# Patient Record
Sex: FEMALE | Race: WHITE | NOT HISPANIC OR LATINO
[De-identification: names, ages, dates, MRNs, and addresses within clinical notes are randomized per-mention and may not be internally consistent; named-entity substitution may affect disease eponyms.]

---

## 2017-06-01 ENCOUNTER — RX ONLY (OUTPATIENT)
Age: 77
Setting detail: RX ONLY
End: 2017-06-01

## 2017-06-01 PROBLEM — D23.39 OTHER BENIGN NEOPLASM OF SKIN OF OTHER PARTS OF FACE: Status: ACTIVE | Noted: 2017-06-01

## 2018-02-20 ENCOUNTER — APPOINTMENT (OUTPATIENT)
Dept: RADIOLOGY | Facility: MEDICAL CENTER | Age: 78
DRG: 481 | End: 2018-02-20
Attending: EMERGENCY MEDICINE
Payer: MEDICARE

## 2018-02-20 ENCOUNTER — RESOLUTE PROFESSIONAL BILLING HOSPITAL PROF FEE (OUTPATIENT)
Dept: HOSPITALIST | Facility: MEDICAL CENTER | Age: 78
End: 2018-02-20
Payer: MEDICARE

## 2018-02-20 ENCOUNTER — HOSPITAL ENCOUNTER (OUTPATIENT)
Dept: RADIOLOGY | Facility: MEDICAL CENTER | Age: 78
End: 2018-02-20

## 2018-02-20 ENCOUNTER — HOSPITAL ENCOUNTER (INPATIENT)
Facility: MEDICAL CENTER | Age: 78
LOS: 3 days | DRG: 481 | End: 2018-02-23
Attending: EMERGENCY MEDICINE | Admitting: FAMILY MEDICINE
Payer: MEDICARE
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
## 2018-02-20 DIAGNOSIS — S72.001A CLOSED FRACTURE OF RIGHT HIP, INITIAL ENCOUNTER (HCC): ICD-10-CM

## 2018-02-20 DIAGNOSIS — N39.0 ACUTE UTI: ICD-10-CM

## 2018-02-20 PROBLEM — S72.009A HIP FRACTURE (HCC): Status: ACTIVE | Noted: 2018-02-20

## 2018-02-20 LAB
ALBUMIN SERPL BCP-MCNC: 4.1 G/DL (ref 3.2–4.9)
ALBUMIN/GLOB SERPL: 1.3 G/DL
ALP SERPL-CCNC: 44 U/L (ref 30–99)
ALT SERPL-CCNC: 12 U/L (ref 2–50)
ANION GAP SERPL CALC-SCNC: 10 MMOL/L (ref 0–11.9)
APPEARANCE UR: CLEAR
APTT PPP: 26.5 SEC (ref 24.7–36)
AST SERPL-CCNC: 17 U/L (ref 12–45)
BACTERIA #/AREA URNS HPF: NEGATIVE /HPF
BASOPHILS # BLD AUTO: 0.6 % (ref 0–1.8)
BASOPHILS # BLD: 0.1 K/UL (ref 0–0.12)
BILIRUB SERPL-MCNC: 0.5 MG/DL (ref 0.1–1.5)
BILIRUB UR QL STRIP.AUTO: NEGATIVE
BNP SERPL-MCNC: 44 PG/ML (ref 0–100)
BUN SERPL-MCNC: 11 MG/DL (ref 8–22)
CALCIUM SERPL-MCNC: 9.9 MG/DL (ref 8.5–10.5)
CHLORIDE SERPL-SCNC: 106 MMOL/L (ref 96–112)
CO2 SERPL-SCNC: 23 MMOL/L (ref 20–33)
COLOR UR: ABNORMAL
CREAT SERPL-MCNC: 0.87 MG/DL (ref 0.5–1.4)
EOSINOPHIL # BLD AUTO: 0.02 K/UL (ref 0–0.51)
EOSINOPHIL NFR BLD: 0.1 % (ref 0–6.9)
EPI CELLS #/AREA URNS HPF: NEGATIVE /HPF
ERYTHROCYTE [DISTWIDTH] IN BLOOD BY AUTOMATED COUNT: 40.9 FL (ref 35.9–50)
GLOBULIN SER CALC-MCNC: 3.2 G/DL (ref 1.9–3.5)
GLUCOSE SERPL-MCNC: 124 MG/DL (ref 65–99)
GLUCOSE UR STRIP.AUTO-MCNC: NEGATIVE MG/DL
HCT VFR BLD AUTO: 35.3 % (ref 37–47)
HGB BLD-MCNC: 11.8 G/DL (ref 12–16)
HYALINE CASTS #/AREA URNS LPF: NORMAL /LPF
IMM GRANULOCYTES # BLD AUTO: 0.07 K/UL (ref 0–0.11)
IMM GRANULOCYTES NFR BLD AUTO: 0.4 % (ref 0–0.9)
INR PPP: 1.08 (ref 0.87–1.13)
KETONES UR STRIP.AUTO-MCNC: ABNORMAL MG/DL
LEUKOCYTE ESTERASE UR QL STRIP.AUTO: NEGATIVE
LYMPHOCYTES # BLD AUTO: 3.2 K/UL (ref 1–4.8)
LYMPHOCYTES NFR BLD: 19.4 % (ref 22–41)
MCH RBC QN AUTO: 29.5 PG (ref 27–33)
MCHC RBC AUTO-ENTMCNC: 33.4 G/DL (ref 33.6–35)
MCV RBC AUTO: 88.3 FL (ref 81.4–97.8)
MICRO URNS: ABNORMAL
MONOCYTES # BLD AUTO: 1.25 K/UL (ref 0–0.85)
MONOCYTES NFR BLD AUTO: 7.6 % (ref 0–13.4)
NEUTROPHILS # BLD AUTO: 11.88 K/UL (ref 2–7.15)
NEUTROPHILS NFR BLD: 71.9 % (ref 44–72)
NITRITE UR QL STRIP.AUTO: POSITIVE
NRBC # BLD AUTO: 0 K/UL
NRBC BLD-RTO: 0 /100 WBC
PH UR STRIP.AUTO: 6.5 [PH]
PLATELET # BLD AUTO: 338 K/UL (ref 164–446)
PMV BLD AUTO: 10.4 FL (ref 9–12.9)
POTASSIUM SERPL-SCNC: 3.5 MMOL/L (ref 3.6–5.5)
PROT SERPL-MCNC: 7.3 G/DL (ref 6–8.2)
PROT UR QL STRIP: NEGATIVE MG/DL
PROTHROMBIN TIME: 13.7 SEC (ref 12–14.6)
RBC # BLD AUTO: 4 M/UL (ref 4.2–5.4)
RBC # URNS HPF: NORMAL /HPF
RBC UR QL AUTO: NEGATIVE
SODIUM SERPL-SCNC: 139 MMOL/L (ref 135–145)
SP GR UR STRIP.AUTO: 1.01
TROPONIN I SERPL-MCNC: <0.01 NG/ML (ref 0–0.04)
UROBILINOGEN UR STRIP.AUTO-MCNC: 0.2 MG/DL
WBC # BLD AUTO: 16.5 K/UL (ref 4.8–10.8)
WBC #/AREA URNS HPF: NORMAL /HPF

## 2018-02-20 PROCEDURE — 81001 URINALYSIS AUTO W/SCOPE: CPT

## 2018-02-20 PROCEDURE — 36415 COLL VENOUS BLD VENIPUNCTURE: CPT

## 2018-02-20 PROCEDURE — 85610 PROTHROMBIN TIME: CPT

## 2018-02-20 PROCEDURE — 99223 1ST HOSP IP/OBS HIGH 75: CPT | Mod: AI | Performed by: FAMILY MEDICINE

## 2018-02-20 PROCEDURE — 72170 X-RAY EXAM OF PELVIS: CPT

## 2018-02-20 PROCEDURE — 700111 HCHG RX REV CODE 636 W/ 250 OVERRIDE (IP): Performed by: EMERGENCY MEDICINE

## 2018-02-20 PROCEDURE — 96374 THER/PROPH/DIAG INJ IV PUSH: CPT

## 2018-02-20 PROCEDURE — 85730 THROMBOPLASTIN TIME PARTIAL: CPT

## 2018-02-20 PROCEDURE — 85025 COMPLETE CBC W/AUTO DIFF WBC: CPT

## 2018-02-20 PROCEDURE — 770006 HCHG ROOM/CARE - MED/SURG/GYN SEMI*

## 2018-02-20 PROCEDURE — 83880 ASSAY OF NATRIURETIC PEPTIDE: CPT

## 2018-02-20 PROCEDURE — 84484 ASSAY OF TROPONIN QUANT: CPT

## 2018-02-20 PROCEDURE — 84443 ASSAY THYROID STIM HORMONE: CPT

## 2018-02-20 PROCEDURE — 71045 X-RAY EXAM CHEST 1 VIEW: CPT

## 2018-02-20 PROCEDURE — 80053 COMPREHEN METABOLIC PANEL: CPT

## 2018-02-20 PROCEDURE — 93005 ELECTROCARDIOGRAM TRACING: CPT | Performed by: EMERGENCY MEDICINE

## 2018-02-20 PROCEDURE — 73552 X-RAY EXAM OF FEMUR 2/>: CPT | Mod: RT

## 2018-02-20 PROCEDURE — 99285 EMERGENCY DEPT VISIT HI MDM: CPT

## 2018-02-20 RX ORDER — ONDANSETRON 2 MG/ML
4 INJECTION INTRAMUSCULAR; INTRAVENOUS ONCE
Status: ACTIVE | OUTPATIENT
Start: 2018-02-20 | End: 2018-02-21

## 2018-02-20 RX ORDER — LISINOPRIL 5 MG/1
5 TABLET ORAL DAILY
COMMUNITY

## 2018-02-20 RX ORDER — CEFTRIAXONE 1 G/1
1 INJECTION, POWDER, FOR SOLUTION INTRAMUSCULAR; INTRAVENOUS ONCE
Status: COMPLETED | OUTPATIENT
Start: 2018-02-20 | End: 2018-02-20

## 2018-02-20 RX ORDER — AMLODIPINE BESYLATE 5 MG/1
5 TABLET ORAL DAILY
COMMUNITY

## 2018-02-20 RX ORDER — MORPHINE SULFATE 4 MG/ML
2 INJECTION, SOLUTION INTRAMUSCULAR; INTRAVENOUS ONCE
Status: COMPLETED | OUTPATIENT
Start: 2018-02-20 | End: 2018-02-21

## 2018-02-20 RX ADMIN — CEFTRIAXONE SODIUM 1 G: 1 INJECTION, POWDER, FOR SOLUTION INTRAMUSCULAR; INTRAVENOUS at 22:04

## 2018-02-20 ASSESSMENT — PATIENT HEALTH QUESTIONNAIRE - PHQ9
SUM OF ALL RESPONSES TO PHQ QUESTIONS 1-9: 0
2. FEELING DOWN, DEPRESSED, IRRITABLE, OR HOPELESS: NOT AT ALL
1. LITTLE INTEREST OR PLEASURE IN DOING THINGS: NOT AT ALL
SUM OF ALL RESPONSES TO PHQ9 QUESTIONS 1 AND 2: 0

## 2018-02-20 ASSESSMENT — ENCOUNTER SYMPTOMS
SENSORY CHANGE: 0
LOSS OF CONSCIOUSNESS: 0

## 2018-02-20 ASSESSMENT — PAIN SCALES - GENERAL
PAINLEVEL_OUTOF10: 2
PAINLEVEL_OUTOF10: 3

## 2018-02-20 ASSESSMENT — LIFESTYLE VARIABLES
ALCOHOL_USE: NO
EVER_SMOKED: YES

## 2018-02-21 ENCOUNTER — APPOINTMENT (OUTPATIENT)
Dept: RADIOLOGY | Facility: MEDICAL CENTER | Age: 78
DRG: 481 | End: 2018-02-21
Attending: ORTHOPAEDIC SURGERY
Payer: MEDICARE

## 2018-02-21 PROBLEM — D72.829 LEUKOCYTOSIS: Status: ACTIVE | Noted: 2018-02-21

## 2018-02-21 PROBLEM — N39.0 UTI (URINARY TRACT INFECTION): Status: ACTIVE | Noted: 2018-02-21

## 2018-02-21 PROBLEM — E87.6 HYPOKALEMIA: Status: ACTIVE | Noted: 2018-02-21

## 2018-02-21 PROBLEM — S72.141A CLOSED INTERTROCHANTERIC FRACTURE OF RIGHT FEMUR (HCC): Status: ACTIVE | Noted: 2018-02-21

## 2018-02-21 PROBLEM — D64.9 NORMOCYTIC ANEMIA: Status: ACTIVE | Noted: 2018-02-21

## 2018-02-21 LAB
GLUCOSE BLD-MCNC: 127 MG/DL (ref 65–99)
GLUCOSE BLD-MCNC: 201 MG/DL (ref 65–99)
PROCALCITONIN SERPL-MCNC: <0.05 NG/ML
TSH SERPL DL<=0.005 MIU/L-ACNC: 0.33 UIU/ML (ref 0.38–5.33)

## 2018-02-21 PROCEDURE — A9270 NON-COVERED ITEM OR SERVICE: HCPCS

## 2018-02-21 PROCEDURE — 700111 HCHG RX REV CODE 636 W/ 250 OVERRIDE (IP): Performed by: ORTHOPAEDIC SURGERY

## 2018-02-21 PROCEDURE — 160048 HCHG OR STATISTICAL LEVEL 1-5: Performed by: ORTHOPAEDIC SURGERY

## 2018-02-21 PROCEDURE — 500891 HCHG PACK, ORTHO MAJOR: Performed by: ORTHOPAEDIC SURGERY

## 2018-02-21 PROCEDURE — 302135 SEQUENTIAL COMPRESSION MACHINE: Performed by: INTERNAL MEDICINE

## 2018-02-21 PROCEDURE — 160036 HCHG PACU - EA ADDL 30 MINS PHASE I: Performed by: ORTHOPAEDIC SURGERY

## 2018-02-21 PROCEDURE — A9270 NON-COVERED ITEM OR SERVICE: HCPCS | Performed by: FAMILY MEDICINE

## 2018-02-21 PROCEDURE — 501838 HCHG SUTURE GENERAL: Performed by: ORTHOPAEDIC SURGERY

## 2018-02-21 PROCEDURE — 160029 HCHG SURGERY MINUTES - 1ST 30 MINS LEVEL 4: Performed by: ORTHOPAEDIC SURGERY

## 2018-02-21 PROCEDURE — 0QS636Z REPOSITION RIGHT UPPER FEMUR WITH INTRAMEDULLARY INTERNAL FIXATION DEVICE, PERCUTANEOUS APPROACH: ICD-10-PCS | Performed by: ORTHOPAEDIC SURGERY

## 2018-02-21 PROCEDURE — 700111 HCHG RX REV CODE 636 W/ 250 OVERRIDE (IP): Performed by: EMERGENCY MEDICINE

## 2018-02-21 PROCEDURE — 700102 HCHG RX REV CODE 250 W/ 637 OVERRIDE(OP): Performed by: ORTHOPAEDIC SURGERY

## 2018-02-21 PROCEDURE — 770006 HCHG ROOM/CARE - MED/SURG/GYN SEMI*

## 2018-02-21 PROCEDURE — A9270 NON-COVERED ITEM OR SERVICE: HCPCS | Performed by: ORTHOPAEDIC SURGERY

## 2018-02-21 PROCEDURE — 73502 X-RAY EXAM HIP UNI 2-3 VIEWS: CPT | Mod: RT

## 2018-02-21 PROCEDURE — 700101 HCHG RX REV CODE 250

## 2018-02-21 PROCEDURE — 700111 HCHG RX REV CODE 636 W/ 250 OVERRIDE (IP)

## 2018-02-21 PROCEDURE — 700102 HCHG RX REV CODE 250 W/ 637 OVERRIDE(OP): Performed by: FAMILY MEDICINE

## 2018-02-21 PROCEDURE — 82962 GLUCOSE BLOOD TEST: CPT

## 2018-02-21 PROCEDURE — 87086 URINE CULTURE/COLONY COUNT: CPT

## 2018-02-21 PROCEDURE — A9270 NON-COVERED ITEM OR SERVICE: HCPCS | Performed by: PHYSICIAN ASSISTANT

## 2018-02-21 PROCEDURE — 160002 HCHG RECOVERY MINUTES (STAT): Performed by: ORTHOPAEDIC SURGERY

## 2018-02-21 PROCEDURE — 700105 HCHG RX REV CODE 258: Performed by: INTERNAL MEDICINE

## 2018-02-21 PROCEDURE — 160009 HCHG ANES TIME/MIN: Performed by: ORTHOPAEDIC SURGERY

## 2018-02-21 PROCEDURE — C1713 ANCHOR/SCREW BN/BN,TIS/BN: HCPCS | Performed by: ORTHOPAEDIC SURGERY

## 2018-02-21 PROCEDURE — 160035 HCHG PACU - 1ST 60 MINS PHASE I: Performed by: ORTHOPAEDIC SURGERY

## 2018-02-21 PROCEDURE — 36415 COLL VENOUS BLD VENIPUNCTURE: CPT

## 2018-02-21 PROCEDURE — 700101 HCHG RX REV CODE 250: Performed by: FAMILY MEDICINE

## 2018-02-21 PROCEDURE — 700102 HCHG RX REV CODE 250 W/ 637 OVERRIDE(OP)

## 2018-02-21 PROCEDURE — 700102 HCHG RX REV CODE 250 W/ 637 OVERRIDE(OP): Performed by: PHYSICIAN ASSISTANT

## 2018-02-21 PROCEDURE — A6402 STERILE GAUZE <= 16 SQ IN: HCPCS | Performed by: ORTHOPAEDIC SURGERY

## 2018-02-21 PROCEDURE — 84145 PROCALCITONIN (PCT): CPT

## 2018-02-21 DEVICE — SCREW CROSS LOCK 5MM X 35MM (4TX5=20): Type: IMPLANTABLE DEVICE | Status: FUNCTIONAL

## 2018-02-21 DEVICE — SCREW LAG 10.5MM X 80MM (4TX1=4): Type: IMPLANTABLE DEVICE | Status: FUNCTIONAL

## 2018-02-21 DEVICE — NAIL HIP 127.5 DEGREE 10MM X 180MM (4TX2=8): Type: IMPLANTABLE DEVICE | Status: FUNCTIONAL

## 2018-02-21 RX ORDER — AMLODIPINE BESYLATE 5 MG/1
5 TABLET ORAL DAILY
Status: DISCONTINUED | OUTPATIENT
Start: 2018-02-21 | End: 2018-02-23 | Stop reason: HOSPADM

## 2018-02-21 RX ORDER — BISACODYL 10 MG
10 SUPPOSITORY, RECTAL RECTAL
Status: DISCONTINUED | OUTPATIENT
Start: 2018-02-21 | End: 2018-02-23 | Stop reason: HOSPADM

## 2018-02-21 RX ORDER — OXYCODONE HYDROCHLORIDE 10 MG/1
10 TABLET ORAL
Status: DISCONTINUED | OUTPATIENT
Start: 2018-02-21 | End: 2018-02-23 | Stop reason: HOSPADM

## 2018-02-21 RX ORDER — AMOXICILLIN 250 MG
2 CAPSULE ORAL 2 TIMES DAILY
Status: DISCONTINUED | OUTPATIENT
Start: 2018-02-21 | End: 2018-02-21

## 2018-02-21 RX ORDER — BISACODYL 10 MG
10 SUPPOSITORY, RECTAL RECTAL
Status: DISCONTINUED | OUTPATIENT
Start: 2018-02-21 | End: 2018-02-21

## 2018-02-21 RX ORDER — ONDANSETRON 2 MG/ML
4 INJECTION INTRAMUSCULAR; INTRAVENOUS EVERY 4 HOURS PRN
Status: DISCONTINUED | OUTPATIENT
Start: 2018-02-21 | End: 2018-02-21

## 2018-02-21 RX ORDER — LISINOPRIL 2.5 MG/1
5 TABLET ORAL DAILY
Status: DISCONTINUED | OUTPATIENT
Start: 2018-02-21 | End: 2018-02-23 | Stop reason: HOSPADM

## 2018-02-21 RX ORDER — OXYCODONE HCL 20 MG/ML
2.5 CONCENTRATE, ORAL ORAL EVERY 4 HOURS PRN
Status: DISCONTINUED | OUTPATIENT
Start: 2018-02-21 | End: 2018-02-21

## 2018-02-21 RX ORDER — ENEMA 19; 7 G/133ML; G/133ML
1 ENEMA RECTAL
Status: DISCONTINUED | OUTPATIENT
Start: 2018-02-21 | End: 2018-02-23 | Stop reason: HOSPADM

## 2018-02-21 RX ORDER — SODIUM CHLORIDE 9 MG/ML
INJECTION, SOLUTION INTRAVENOUS CONTINUOUS
Status: DISCONTINUED | OUTPATIENT
Start: 2018-02-21 | End: 2018-02-23 | Stop reason: HOSPADM

## 2018-02-21 RX ORDER — ENALAPRILAT 1.25 MG/ML
1.25 INJECTION INTRAVENOUS EVERY 6 HOURS PRN
Status: DISCONTINUED | OUTPATIENT
Start: 2018-02-21 | End: 2018-02-23 | Stop reason: HOSPADM

## 2018-02-21 RX ORDER — AMOXICILLIN 250 MG
1 CAPSULE ORAL
Status: DISCONTINUED | OUTPATIENT
Start: 2018-02-21 | End: 2018-02-23 | Stop reason: HOSPADM

## 2018-02-21 RX ORDER — OXYCODONE HYDROCHLORIDE 5 MG/1
5 TABLET ORAL
Status: DISCONTINUED | OUTPATIENT
Start: 2018-02-21 | End: 2018-02-21

## 2018-02-21 RX ORDER — OXYCODONE HYDROCHLORIDE 5 MG/1
5 TABLET ORAL
Status: DISCONTINUED | OUTPATIENT
Start: 2018-02-21 | End: 2018-02-23 | Stop reason: HOSPADM

## 2018-02-21 RX ORDER — DIPHENHYDRAMINE HYDROCHLORIDE 50 MG/ML
25 INJECTION INTRAMUSCULAR; INTRAVENOUS EVERY 6 HOURS PRN
Status: DISCONTINUED | OUTPATIENT
Start: 2018-02-21 | End: 2018-02-23 | Stop reason: HOSPADM

## 2018-02-21 RX ORDER — ACETAMINOPHEN 325 MG/1
650 TABLET ORAL EVERY 6 HOURS
Status: DISCONTINUED | OUTPATIENT
Start: 2018-02-21 | End: 2018-02-23 | Stop reason: HOSPADM

## 2018-02-21 RX ORDER — SODIUM CHLORIDE AND POTASSIUM CHLORIDE 150; 900 MG/100ML; MG/100ML
INJECTION, SOLUTION INTRAVENOUS CONTINUOUS
Status: DISPENSED | OUTPATIENT
Start: 2018-02-21 | End: 2018-02-21

## 2018-02-21 RX ORDER — DOCUSATE SODIUM 100 MG/1
100 CAPSULE, LIQUID FILLED ORAL 2 TIMES DAILY
Status: DISCONTINUED | OUTPATIENT
Start: 2018-02-21 | End: 2018-02-23 | Stop reason: HOSPADM

## 2018-02-21 RX ORDER — HALOPERIDOL 5 MG/ML
1 INJECTION INTRAMUSCULAR EVERY 6 HOURS PRN
Status: DISCONTINUED | OUTPATIENT
Start: 2018-02-21 | End: 2018-02-23 | Stop reason: HOSPADM

## 2018-02-21 RX ORDER — ACETAMINOPHEN 500 MG
TABLET ORAL
Status: COMPLETED
Start: 2018-02-21 | End: 2018-02-21

## 2018-02-21 RX ORDER — ONDANSETRON 4 MG/1
4 TABLET, ORALLY DISINTEGRATING ORAL EVERY 4 HOURS PRN
Status: DISCONTINUED | OUTPATIENT
Start: 2018-02-21 | End: 2018-02-23 | Stop reason: HOSPADM

## 2018-02-21 RX ORDER — DEXAMETHASONE SODIUM PHOSPHATE 4 MG/ML
4 INJECTION, SOLUTION INTRA-ARTICULAR; INTRALESIONAL; INTRAMUSCULAR; INTRAVENOUS; SOFT TISSUE
Status: DISCONTINUED | OUTPATIENT
Start: 2018-02-21 | End: 2018-02-23 | Stop reason: HOSPADM

## 2018-02-21 RX ORDER — SCOLOPAMINE TRANSDERMAL SYSTEM 1 MG/1
1 PATCH, EXTENDED RELEASE TRANSDERMAL
Status: DISCONTINUED | OUTPATIENT
Start: 2018-02-21 | End: 2018-02-23 | Stop reason: HOSPADM

## 2018-02-21 RX ORDER — AMOXICILLIN 250 MG
1 CAPSULE ORAL NIGHTLY
Status: DISCONTINUED | OUTPATIENT
Start: 2018-02-21 | End: 2018-02-23 | Stop reason: HOSPADM

## 2018-02-21 RX ORDER — OXYCODONE HCL 5 MG/5 ML
SOLUTION, ORAL ORAL
Status: COMPLETED
Start: 2018-02-21 | End: 2018-02-21

## 2018-02-21 RX ORDER — POLYETHYLENE GLYCOL 3350 17 G/17G
1 POWDER, FOR SOLUTION ORAL
Status: DISCONTINUED | OUTPATIENT
Start: 2018-02-21 | End: 2018-02-21

## 2018-02-21 RX ORDER — HEPARIN SODIUM 5000 [USP'U]/ML
5000 INJECTION, SOLUTION INTRAVENOUS; SUBCUTANEOUS EVERY 8 HOURS
Status: DISCONTINUED | OUTPATIENT
Start: 2018-02-21 | End: 2018-02-21

## 2018-02-21 RX ORDER — OXYCODONE HYDROCHLORIDE 5 MG/1
5-10 TABLET ORAL
Status: DISCONTINUED | OUTPATIENT
Start: 2018-02-21 | End: 2018-02-21

## 2018-02-21 RX ORDER — ACETAMINOPHEN 325 MG/1
650 TABLET ORAL EVERY 6 HOURS PRN
Status: DISCONTINUED | OUTPATIENT
Start: 2018-02-21 | End: 2018-02-23 | Stop reason: HOSPADM

## 2018-02-21 RX ORDER — ONDANSETRON 2 MG/ML
4 INJECTION INTRAMUSCULAR; INTRAVENOUS EVERY 4 HOURS PRN
Status: DISCONTINUED | OUTPATIENT
Start: 2018-02-21 | End: 2018-02-23 | Stop reason: HOSPADM

## 2018-02-21 RX ORDER — DEXTROSE MONOHYDRATE 25 G/50ML
25 INJECTION, SOLUTION INTRAVENOUS
Status: DISCONTINUED | OUTPATIENT
Start: 2018-02-21 | End: 2018-02-22

## 2018-02-21 RX ORDER — KETOROLAC TROMETHAMINE 30 MG/ML
15 INJECTION, SOLUTION INTRAMUSCULAR; INTRAVENOUS EVERY 6 HOURS
Status: DISCONTINUED | OUTPATIENT
Start: 2018-02-21 | End: 2018-02-23 | Stop reason: HOSPADM

## 2018-02-21 RX ORDER — OXYCODONE HYDROCHLORIDE 10 MG/1
10 TABLET ORAL
Status: DISCONTINUED | OUTPATIENT
Start: 2018-02-21 | End: 2018-02-21

## 2018-02-21 RX ORDER — POLYETHYLENE GLYCOL 3350 17 G/17G
1 POWDER, FOR SOLUTION ORAL 2 TIMES DAILY PRN
Status: DISCONTINUED | OUTPATIENT
Start: 2018-02-21 | End: 2018-02-23 | Stop reason: HOSPADM

## 2018-02-21 RX ORDER — CEFAZOLIN SODIUM 2 G/100ML
2 INJECTION, SOLUTION INTRAVENOUS EVERY 8 HOURS
Status: COMPLETED | OUTPATIENT
Start: 2018-02-21 | End: 2018-02-22

## 2018-02-21 RX ADMIN — SODIUM CHLORIDE: 9 INJECTION, SOLUTION INTRAVENOUS at 22:00

## 2018-02-21 RX ADMIN — POTASSIUM CHLORIDE AND SODIUM CHLORIDE: 900; 150 INJECTION, SOLUTION INTRAVENOUS at 03:26

## 2018-02-21 RX ADMIN — OXYCODONE HYDROCHLORIDE 2.5 MG: 5 SOLUTION ORAL at 12:30

## 2018-02-21 RX ADMIN — CEFAZOLIN SODIUM 2 G: 2 INJECTION, SOLUTION INTRAVENOUS at 20:34

## 2018-02-21 RX ADMIN — AMLODIPINE BESYLATE 5 MG: 5 TABLET ORAL at 11:00

## 2018-02-21 RX ADMIN — ACETAMINOPHEN 650 MG: 325 TABLET, FILM COATED ORAL at 20:34

## 2018-02-21 RX ADMIN — KETOROLAC TROMETHAMINE 15 MG: 30 INJECTION, SOLUTION INTRAMUSCULAR at 20:34

## 2018-02-21 RX ADMIN — ACETAMINOPHEN 1000 MG: 500 TABLET, FILM COATED ORAL at 11:10

## 2018-02-21 RX ADMIN — ACETAMINOPHEN 650 MG: 325 TABLET, FILM COATED ORAL at 03:26

## 2018-02-21 RX ADMIN — OXYCODONE HYDROCHLORIDE 5 MG: 5 TABLET ORAL at 16:01

## 2018-02-21 RX ADMIN — LISINOPRIL 5 MG: 2.5 TABLET ORAL at 11:00

## 2018-02-21 RX ADMIN — MORPHINE SULFATE 2 MG: 4 INJECTION INTRAVENOUS at 04:59

## 2018-02-21 ASSESSMENT — ENCOUNTER SYMPTOMS
BRUISES/BLEEDS EASILY: 0
MYALGIAS: 0
BLURRED VISION: 0
HEARTBURN: 0
CHILLS: 0
DEPRESSION: 0
COUGH: 0
PALPITATIONS: 0
DIZZINESS: 0
FEVER: 0
NAUSEA: 0
HEMOPTYSIS: 0
DOUBLE VISION: 0
FALLS: 1

## 2018-02-21 ASSESSMENT — PAIN SCALES - GENERAL
PAINLEVEL_OUTOF10: 4
PAINLEVEL_OUTOF10: 2
PAINLEVEL_OUTOF10: 0
PAINLEVEL_OUTOF10: 2
PAINLEVEL_OUTOF10: 2

## 2018-02-21 NOTE — ASSESSMENT & PLAN NOTE
As seen on x-ray. Status post mechanical fall. Orthopedic surgeon consulted. On full precautions.   POD #1  Tolerated the procedure very well. PTOT  Possibly need SNF

## 2018-02-21 NOTE — ED TRIAGE NOTES
Pt presents to ED via EMS as a transfer for a R hip fracture. Pt tripped on a rug at home today around 1430.

## 2018-02-21 NOTE — PROGRESS NOTES
Pt arrived to unit around 2330 with family present at bedside. Report received from Reinaldo DURAN in ED around 2335. Pt is now NPO and is not currently complaining of any pain. Ice applied to the right hip to help with swelling and discomfort.

## 2018-02-21 NOTE — OP REPORT
DATE OF SERVICE:  02/21/2018    PREOPERATIVE DIAGNOSIS:  Right intertrochanteric femur fracture.    POSTOPERATIVE DIAGNOSIS:  Right intertrochanteric femur fracture.    PROCEDURE:  Intramedullary nail, right hip.    NAME OF SURGEON:  Jelani Rodriguez MD    ASSISTANT:  Danielito Aquino PA-C    ESTIMATED BLOOD LOSS:  Minimal.    INDICATIONS:  This is a 77-year-old female status post fall, during which she   sustained a right displaced intertrochanteric femur fracture.  Risks and   benefits of intramedullary nailing were discussed at length, which include but   not limited to bleeding, infection, neurovascular damage, pain, stiffness,   malunion, nonunion, DVT, PE, MI, stroke, and death.  She understands all these   risks and wishes to proceed.    DESCRIPTION OF PROCEDURE:  The patient was sedated with LMA anesthesia and   administered preoperative antibiotics.  She was placed on the fracture table   and the fracture reduced with slight traction and internal rotation.  Right   hip and lower extremities were prepped and draped in usual sterile fashion.  A   guide pin for OIC hip nail was inserted at the tip of the greater trochanter.    Anterior reamer was utilized and a [id number] nail was inserted to the   appropriate depth.  Guide pin was placed in the center-center position in the   femoral head.  An 80 mm lag screw was placed.  Compression was obtained.  Set   screw was tightened.  A single distal cross lock screw was placed.  Wounds   were irrigated, closed with 2-0 Vicryl suture and staples.  Sterile dressings   were applied.  The patient tolerated the procedure well.    POSTOPERATIVE PLAN:  The patient will be weightbearing as tolerated, admitted   for perioperative antibiotics, DVT prophylaxis, and pain control.       ____________________________________     JELANI RODRIGUEZ MD    ALEISHA / NTS    DD:  02/21/2018 11:49:14  DT:  02/21/2018 11:58:20    D#:  [id number]  Job#:  [id number]

## 2018-02-21 NOTE — H&P
Hospital Medicine History and Physical    Date of Service  2/20/2018    Chief Complaint  Chief Complaint   Patient presents with   • Hip Pain     R hip/leg pain status post GLF at home today aroud 1400.    • Leg Pain       History of Presenting Illness  77 y.o. female with past medical history of hypertension on atenolol right intertrochanteric femur fracture after ground-level mechanical fall. We are physician consulted orthopedic surgery. Patient denies any head injury. She denies any loss of consciousness or dizziness. She reported dysuria. Denies any fever or chills. Was found to have UTI. Started on antibiotics.   Primary Care Physician  FEMI Hernández    Consultants  Orthopedic surgery    Code Status  Full    Review of Systems  Review of Systems   Constitutional: Negative for chills and fever.   HENT: Negative for hearing loss.    Eyes: Negative for blurred vision and double vision.   Respiratory: Negative for cough and hemoptysis.    Cardiovascular: Negative for chest pain and palpitations.   Gastrointestinal: Negative for heartburn and nausea.   Genitourinary: Positive for dysuria and urgency.   Musculoskeletal: Positive for falls. Negative for myalgias.   Skin: Negative for rash.   Neurological: Negative for dizziness.   Endo/Heme/Allergies: Does not bruise/bleed easily.   Psychiatric/Behavioral: Negative for depression and suicidal ideas.        Past Medical History  Past Medical History:   Diagnosis Date   • Hypertension        Surgical History  No past surgical history on file.    Medications  No current facility-administered medications on file prior to encounter.      No current outpatient prescriptions on file prior to encounter.       Family History  History reviewed. No pertinent family history.    Social History  Social History   Substance Use Topics   • Smoking status: Former Smoker   • Smokeless tobacco: Never Used   • Alcohol use Yes      Comment: Socially       Allergies  Allergies    Allergen Reactions   • Aspirin    • Sulfa Drugs         Physical Exam  Laboratory   Hemodynamics  Temp (24hrs), Av °C (98.6 °F), Min:36.9 °C (98.4 °F), Max:37.2 °C (98.9 °F)   Temperature: 37.2 °C (98.9 °F)  Pulse  Av  Min: 93  Max: 108    Blood Pressure : 126/56, NIBP: 123/55      Respiratory      Respiration: 16, Pulse Oximetry: 95 %             Physical Exam   Constitutional: She is oriented to person, place, and time. No distress.   HENT:   Head: Normocephalic and atraumatic.   Eyes: Pupils are equal, round, and reactive to light. No scleral icterus.   Neck: Normal range of motion. No thyromegaly present.   Cardiovascular: Normal rate and regular rhythm.  Exam reveals no friction rub.    Pulmonary/Chest: Effort normal. No respiratory distress.   Abdominal: Soft. She exhibits no distension.   Musculoskeletal:   Right lower extremity is shortened and externally rotated   Neurological: She is alert and oriented to person, place, and time.   Skin: Skin is warm. She is not diaphoretic. No erythema.   Psychiatric: She has a normal mood and affect. Her behavior is normal.       Recent Labs      18   WBC  16.5*   RBC  4.00*   HEMOGLOBIN  11.8*   HEMATOCRIT  35.3*   MCV  88.3   MCH  29.5   MCHC  33.4*   RDW  40.9   PLATELETCT  338   MPV  10.4     Recent Labs      18   SODIUM  139   POTASSIUM  3.5*   CHLORIDE  106   CO2  23   GLUCOSE  124*   BUN  11   CREATININE  0.87   CALCIUM  9.9     Recent Labs      18   ALTSGPT  12   ASTSGOT  17   ALKPHOSPHAT  44   TBILIRUBIN  0.5   GLUCOSE  124*     Recent Labs      18   APTT  26.5   INR  1.08     Recent Labs      18   BNPBTYPENAT  44         Lab Results   Component Value Date    TROPONINI <0.01 2018     Urinalysis:    Lab Results  Component Value Date/Time   SPECGRAVITY 1.010 2018   GLUCOSEUR Negative 2018   KETONES Trace (A) 2018   NITRITE Positive (A) 2018  2016   WBCURINE 0-2 02/20/2018 2016   RBCURINE 0-2 02/20/2018 2016   BACTERIA Negative 02/20/2018 2016   EPITHELCELL Negative 02/20/2018 2016        Imaging  Reviewed    Assessment/Plan     I anticipate this patient will require at least 2 midnight stay for appropriate medical management.    * Closed intertrochanteric fracture of right femur (CMS-[facility])- (present on admission)   Assessment & Plan    As seen on x-ray. Status post mechanical fall. Orthopedic surgeon consulted. On full precautions.         UTI (urinary tract infection)- (present on admission)   Assessment & Plan    UA was positive. Urine was sent for culture. IV antibiotics for now.        Hypokalemia- (present on admission)   Assessment & Plan    On replacement.        Normocytic anemia- (present on admission)   Assessment & Plan    Monitor H&H.        Leukocytosis- (present on admission)   Assessment & Plan    Stress induced plus UTI. On IV antibiotics. Continue to monitor.            VTE prophylaxis: SCDs

## 2018-02-21 NOTE — ED NOTES
Chief Complaint   Patient presents with   • Hip Pain     R hip/leg pain status post GLF at home today aroud 1400.    • Leg Pain     Agree with triage RN.  Guzmán inserted, pt states relief.  Pt blood drawn and urine sent to lab.  Pt states relief from pain from medication in route and refused pain and nausea meds.

## 2018-02-21 NOTE — CONSULTS
DATE OF SERVICE:  02/21/2018    CHIEF COMPLAINT:  Right hip pain.    HISTORY OF PRESENT ILLNESS:  The patient is a very pleasant 77-year-old female   who experienced a fall after tripping over a rug yesterday afternoon at home.    She landed on her right hip and immediately had significant pain and was   able to bear weight.  She was transported to the [facility] Department   where initial x-rays demonstrated a right intertrochanteric femur fracture.    As a result, the orthopedic surgery team on-call was consulted for further   evaluation.    The patient was seen and examined in her hospital room.  She currently   complains of generalized numbness on the right hip, otherwise she denies any   change in strength or sensation throughout the right lower extremity.    REVIEW OF SYSTEMS:  As per HPI.    PAST MEDICAL HISTORY:  Hypertension.    PAST SURGICAL HISTORY:  None.    SOCIAL HISTORY:  The patient used to smoke cigarettes, but has quit.  She does   socially drink alcohol.    FAMILY HISTORY:  Noncontributory.    MEDICATIONS:  Amlodipine, lisinopril.    ALLERGIES:  ASPIRIN, SULFA DRUGS.    PHYSICAL EXAMINATION:  GENERAL:  The patient is resting comfortable in the bed.  She is in no acute   distress.  MUSCULOSKELETAL:  Examination of the right lower extremity demonstrates no   obvious deformity.  No overlying skin lesions, abrasions, or lacerations to   suggest an open fracture or injury.  The right lower extremity is slightly   shortened compared to the contralateral side and is sitting in somewhat of an   externally rotated position.  Little bit of tenderness around the hip joint   region, but no tenderness around the thigh, knee, lower leg, ankle, and foot.    Some pain within the proximal thigh with gentle logroll.  She clearly fires   the tibialis anterior, EHL, and gastrocnemius soleus.  Sensation is intact to   light touch throughout the superficial peroneal, deep peroneal nerve   distributions.  I do  note strong palpable 2+ dorsalis pedis and posterior   tibial pulse with all toes being pink and well perfused.    IMAGING:  X-rays of the right hip and pelvis performed yesterday evening   demonstrates a stable intertrochanteric femur fracture.  The lesser trochanter   appears to be attached to the distal fragment.  Otherwise, the femoral head   is located within the acetabulum, which is little bit of moderate arthritic   changes within the hip joint.    IMPRESSION:  Right hip closed intertrochanteric fracture.    PLAN:  The patient is a very pleasant 77-year-old female who unfortunately   experienced a fall from standing yesterday afternoon; at this point in time,   she has a right closed intertrochanteric fracture of the proximal femur.  I   did recommend surgical intervention consisting of closed reduction and   cephalomedullary nail fixation.  We discussed the risks, benefits, and   alternatives of such an operation as well as typical postoperative   rehabilitation.  After answering all questions, she elected to proceed.       ____________________________________     MD WILLARD Corcoran / AMPARO    DD:  02/21/2018 05:50:39  DT:  02/21/2018 06:46:34    D#:  [id number]  Job#:  [id number]

## 2018-02-21 NOTE — CARE PLAN
Problem: Communication  Goal: The ability to communicate needs accurately and effectively will improve    Intervention: [place] patient and significant other/support system to call light to alert staff of needs  Pt oriented to use of call light system and calls appropriately.      Problem: Pain Management  Goal: Pain level will decrease to patient's comfort goal    Intervention: Follow pain managment plan developed in collaboration with patient and Interdisciplinary Team  Pt is currently denying pain and refusing pain meds. Ice placed to affected hip to help reduce swelling and discomfort.

## 2018-02-21 NOTE — ED PROVIDER NOTES
ED Provider Note    Scribed for Keagan Moreno M.D. by Fani Schultz. 2/20/2018, 7:16 PM.    Primary care provider: None.  Means of arrival: EMS  History obtained from: Patient  History limited by: None     CHIEF COMPLAINT  Chief Complaint   Patient presents with   • Hip Pain     R hip/leg pain status post GLF at home today aroud 1400.    • Leg Pain     HPI  Rukhsana Solis is a 77 y.o. female who presents to the Emergency Department by ambulance for right hip pain onset today at 2:30 PM following a mechanical ground level fall.  Is located in the right hip.  Wrist by movement.  Moderate to severe.  No other pain.  No numbness or tingling or other associated complaints.  Patient states she tripped over a rug, causing her to fall. She denies hitting her head, or losing consciousness. Patient has history of osteoporosis and hypertension. She takes Lisinopril and amlodipine. Patient does not smoke cigarettes or drink alcohol. Patient denies any other injuries at this time.     REVIEW OF SYSTEMS  Review of Systems   Musculoskeletal:        Positive for right hip pain.    Neurological: Negative for sensory change and loss of consciousness.        Negative for head injuries.    All other systems reviewed and are negative.  C.     PAST MEDICAL HISTORY   has a past medical history of Hypertension.  ,    SURGICAL HISTORY  patient denies any surgical history    SOCIAL HISTORY  Social History   Substance Use Topics   • Smoking status: Former Smoker   • Smokeless tobacco: Never Used   • Alcohol use Yes      Comment: Socially      History   Drug Use No       FAMILY HISTORY  History reviewed. No pertinent family history.    CURRENT MEDICATIONS  Home Medications     Reviewed by Ella Sánchez R.N. (Registered Nurse) on 02/20/18 at 1910  Med List Status: Partial   Medication Last Dose Status   amLODIPine (NORVASC) 5 MG Tab  Active   lisinopril (PRINIVIL) 5 MG Tab  Active                ALLERGIES  Allergies   Allergen  "Reactions   • Aspirin    • Sulfa Drugs        PHYSICAL EXAM  VITAL SIGNS: /67   Pulse (!) 108   Temp 37 °C (98.6 °F)   Resp 20   Ht 1.473 m (4' 10\")   Wt 48.1 kg (106 lb)   BMI 22.15 kg/m²   Vitals reviewed.  Constitutional: Well developed, Well nourished, No acute distress, Non-toxic appearance.   HENT: Normocephalic, Atraumatic, Bilateral external ears normal, Oropharynx moist, No oral exudates, Nose normal.   Eyes: PERRL, EOMI, Conjunctiva normal, No discharge.   Neck: Normal range of motion, No tenderness, Supple, No stridor.   Cardiovascular: Normal heart rate, Normal rhythm, No murmurs, No rubs, No gallops.   Thorax & Lungs: Normal breath sounds, No respiratory distress, No wheezing, No chest tenderness.   Abdomen: Bowel sounds normal, Soft, No tenderness  Skin: Warm, Dry, No erythema, No rash.   Back: No tenderness, No CVA tenderness.   Musculoskeletal: Tenderness in the right hip.  Pain with range of motion.  All pulses.  Normal neurovascular examination.  Neurologic: Alert, Normal motor function, Normal sensory function, No focal deficits noted.   Psychiatric: Affect normal    LABS  Results for orders placed or performed during the hospital encounter of 02/20/18   CBC w/ Differential   Result Value Ref Range    WBC 16.5 (H) 4.8 - 10.8 K/uL    RBC 4.00 (L) 4.20 - 5.40 M/uL    Hemoglobin 11.8 (L) 12.0 - 16.0 g/dL    Hematocrit 35.3 (L) 37.0 - 47.0 %    MCV 88.3 81.4 - 97.8 fL    MCH 29.5 27.0 - 33.0 pg    MCHC 33.4 (L) 33.6 - 35.0 g/dL    RDW 40.9 35.9 - 50.0 fL    Platelet Count 338 164 - 446 K/uL    MPV 10.4 9.0 - 12.9 fL    Neutrophils-Polys 71.90 44.00 - 72.00 %    Lymphocytes 19.40 (L) 22.00 - 41.00 %    Monocytes 7.60 0.00 - 13.40 %    Eosinophils 0.10 0.00 - 6.90 %    Basophils 0.60 0.00 - 1.80 %    Immature Granulocytes 0.40 0.00 - 0.90 %    Nucleated RBC 0.00 /100 WBC    Neutrophils (Absolute) 11.88 (H) 2.00 - 7.15 K/uL    Lymphs (Absolute) 3.20 1.00 - 4.80 K/uL    Monos (Absolute) 1.25 " (H) 0.00 - 0.85 K/uL    Eos (Absolute) 0.02 0.00 - 0.51 K/uL    Baso (Absolute) 0.10 0.00 - 0.12 K/uL    Immature Granulocytes (abs) 0.07 0.00 - 0.11 K/uL    NRBC (Absolute) 0.00 K/uL   Complete Metabolic Panel (CMP)   Result Value Ref Range    Sodium 139 135 - 145 mmol/L    Potassium 3.5 (L) 3.6 - 5.5 mmol/L    Chloride 106 96 - 112 mmol/L    Co2 23 20 - 33 mmol/L    Anion Gap 10.0 0.0 - 11.9    Glucose 124 (H) 65 - 99 mg/dL    Bun 11 8 - 22 mg/dL    Creatinine 0.87 0.50 - 1.40 mg/dL    Calcium 9.9 8.5 - 10.5 mg/dL    AST(SGOT) 17 12 - 45 U/L    ALT(SGPT) 12 2 - 50 U/L    Alkaline Phosphatase 44 30 - 99 U/L    Total Bilirubin 0.5 0.1 - 1.5 mg/dL    Albumin 4.1 3.2 - 4.9 g/dL    Total Protein 7.3 6.0 - 8.2 g/dL    Globulin 3.2 1.9 - 3.5 g/dL    A-G Ratio 1.3 g/dL   Troponin   Result Value Ref Range    Troponin I <0.01 0.00 - 0.04 ng/mL   Prothrombin (PT/INR)   Result Value Ref Range    PT 13.7 12.0 - 14.6 sec    INR 1.08 0.87 - 1.13   APTT   Result Value Ref Range    APTT 26.5 24.7 - 36.0 sec   URINALYSIS   Result Value Ref Range    Color DK Yellow     Character Clear     Specific Gravity 1.010 <1.035    Ph 6.5 5.0 - 8.0    Glucose Negative Negative mg/dL    Ketones Trace (A) Negative mg/dL    Protein Negative Negative mg/dL    Bilirubin Negative Negative    Urobilinogen, Urine 0.2 Negative    Nitrite Positive (A) Negative    Leukocyte Esterase Negative Negative    Occult Blood Negative Negative    Micro Urine Req Microscopic    ESTIMATED GFR   Result Value Ref Range    GFR If African American >60 >60 mL/min/1.73 m 2    GFR If Non African American >60 >60 mL/min/1.73 m 2   URINE MICROSCOPIC (W/UA)   Result Value Ref Range    WBC 0-2 /hpf    RBC 0-2 /hpf    Bacteria Negative None /hpf    Epithelial Cells Negative /hpf    Hyaline Cast 0-2 /lpf   EKG (NOW)   Result Value Ref Range    Report       [facility] Emergency Dept.    Test Date:  2018-02-20  Pt Name:    KIMBERLY BURRELL               Department: ER  MRN:        [id number]                      Room:       [facility]  Gender:     Female                       Technician: [id number]  :        [id number]                   Requested By:NORMA HERNANDEZ  Order #:    [id number]                    Reading MD:    Measurements  Intervals                                Axis  Rate:       98                           P:          70  HI:         196                          QRS:        50  QRSD:       88                           T:          37  QT:         340  QTc:        435    Interpretive Statements  SINUS RHYTHM  No previous ECG available for comparison        All labs reviewed by me.    EKG Interpretation  EKG Interpretation    Interpreted by me    Rhythm: normal sinus   Rate: normal  Axis: normal  Ectopy: none  Conduction: normal  ST Segments: no acute change  T Waves: no acute change  Q Waves: none    Clinical Impression: no acute changes and normal EKG    RADIOLOGY  DX-FEMUR-2+ RIGHT   Final Result         Acute displaced intertrochanteric fracture of the right femur.      DX-PELVIS-1 OR 2 VIEWS   Final Result         Acute displaced intertrochanteric fracture of the right femur.      DX-CHEST-LIMITED (1 VIEW)   Final Result         No acute cardiopulmonary abnormalities are identified.      OUTSIDE IMAGES-DX PELVIS   Final Result        The radiologist's interpretation of all radiological studies have been reviewed by me.    COURSE & MEDICAL DECISION MAKING  Pertinent Labs & Imaging studies reviewed. (See chart for details)        7:16 PM Patient seen and examined at bedside. The patient presents with right hip pain and fracture. I informed the patient of my plan to admit her so she may have surgery performed either tonight or tomorrow depending on availability. I also informed the patient we will most likely need to place a catheter since she is not able to ambulate secondary to her injury. I advised the patient to inform us if she would like pain medication.  She was agreeable.     7:29 PM Reviewed outside imaging which reveals fracture.       8:01 PM Consulted with ortho, Dr. John and discussed patient's condition.     Patient has a UTI as well.  She is a white count and an abnormal urinalysis, culture.  She is given some IV antibiotics.  She'll be admitted to the hospital's.  Guzmán catheter is placed.  She is given pain medicines as needed.  Questions are answered.  She is admitted to hospitalist in guarded condition.    DISPOSITION:  Patient will be admitted to [facility] guarded condition.    FINAL IMPRESSION  1. Fall, initial encounter    2. Closed fracture of right hip, initial encounter (CMS-[facility])    3. Acute UTI          IFani (Scribe), am scribing for, and in the presence of, Keagan Moreno M.D..    Electronically signed by: Fani Schultz (Scribe), 2/20/2018    IKeagan M.D. personally performed the services described in this documentation, as scribed by Fani Schultz in my presence, and it is both accurate and complete.    The note accurately reflects work and decisions made by me.  Keagan Moreno  2/20/2018  10:02 PM

## 2018-02-21 NOTE — PROGRESS NOTES
Patient is examed and agreed with H&P assessment and plan.    Patient received ORIF today. Tolerated. Discussed patient's case with the nurse and consultant.

## 2018-02-21 NOTE — PROGRESS NOTES
Shift report received from night RN, assumed care at 0715. Patient resting in bed with no pain present, routinely medicated prn per MAR. AOx4, up with assist, calls appropriately, bed alarm not in use. PIV assessed and running NS, CDI. O2 1L nasal cannula, SPO2 97%. VTE Heparin but held overnight for surgery this afternoon. Plan is surgery today. Discussed POC for multimodal pain management, monitoring VS/labs/I&O, mobility, day time routine, comfort, and safety. Patient has call light and personal belongings within reach. Safety and fall precautions in place. Reviewed current labs, notes, medications, and orders. Hourly rounding in place with RN and CNA.

## 2018-02-22 LAB
ALBUMIN SERPL BCP-MCNC: 3.6 G/DL (ref 3.2–4.9)
ALBUMIN/GLOB SERPL: 1.5 G/DL
ALP SERPL-CCNC: 35 U/L (ref 30–99)
ALT SERPL-CCNC: 10 U/L (ref 2–50)
ANION GAP SERPL CALC-SCNC: 10 MMOL/L (ref 0–11.9)
AST SERPL-CCNC: 16 U/L (ref 12–45)
BASOPHILS # BLD AUTO: 0.1 % (ref 0–1.8)
BASOPHILS # BLD: 0.01 K/UL (ref 0–0.12)
BILIRUB SERPL-MCNC: 0.2 MG/DL (ref 0.1–1.5)
BUN SERPL-MCNC: 10 MG/DL (ref 8–22)
CALCIUM SERPL-MCNC: 8.4 MG/DL (ref 8.5–10.5)
CHLORIDE SERPL-SCNC: 106 MMOL/L (ref 96–112)
CO2 SERPL-SCNC: 23 MMOL/L (ref 20–33)
CREAT SERPL-MCNC: 0.74 MG/DL (ref 0.5–1.4)
EOSINOPHIL # BLD AUTO: 0 K/UL (ref 0–0.51)
EOSINOPHIL NFR BLD: 0 % (ref 0–6.9)
ERYTHROCYTE [DISTWIDTH] IN BLOOD BY AUTOMATED COUNT: 41.5 FL (ref 35.9–50)
GLOBULIN SER CALC-MCNC: 2.4 G/DL (ref 1.9–3.5)
GLUCOSE SERPL-MCNC: 126 MG/DL (ref 65–99)
HCT VFR BLD AUTO: 28.9 % (ref 37–47)
HGB BLD-MCNC: 9.8 G/DL (ref 12–16)
IMM GRANULOCYTES # BLD AUTO: 0.1 K/UL (ref 0–0.11)
IMM GRANULOCYTES NFR BLD AUTO: 0.6 % (ref 0–0.9)
LYMPHOCYTES # BLD AUTO: 1.28 K/UL (ref 1–4.8)
LYMPHOCYTES NFR BLD: 7.6 % (ref 22–41)
MCH RBC QN AUTO: 30.5 PG (ref 27–33)
MCHC RBC AUTO-ENTMCNC: 33.9 G/DL (ref 33.6–35)
MCV RBC AUTO: 90 FL (ref 81.4–97.8)
MONOCYTES # BLD AUTO: 0.74 K/UL (ref 0–0.85)
MONOCYTES NFR BLD AUTO: 4.4 % (ref 0–13.4)
NEUTROPHILS # BLD AUTO: 14.73 K/UL (ref 2–7.15)
NEUTROPHILS NFR BLD: 87.3 % (ref 44–72)
NRBC # BLD AUTO: 0 K/UL
NRBC BLD-RTO: 0 /100 WBC
PLATELET # BLD AUTO: 240 K/UL (ref 164–446)
PMV BLD AUTO: 10.6 FL (ref 9–12.9)
POTASSIUM SERPL-SCNC: 3.8 MMOL/L (ref 3.6–5.5)
PROT SERPL-MCNC: 6 G/DL (ref 6–8.2)
RBC # BLD AUTO: 3.21 M/UL (ref 4.2–5.4)
SODIUM SERPL-SCNC: 139 MMOL/L (ref 135–145)
WBC # BLD AUTO: 16.9 K/UL (ref 4.8–10.8)

## 2018-02-22 PROCEDURE — 97166 OT EVAL MOD COMPLEX 45 MIN: CPT

## 2018-02-22 PROCEDURE — 700105 HCHG RX REV CODE 258: Performed by: INTERNAL MEDICINE

## 2018-02-22 PROCEDURE — A9270 NON-COVERED ITEM OR SERVICE: HCPCS | Performed by: FAMILY MEDICINE

## 2018-02-22 PROCEDURE — 85025 COMPLETE CBC W/AUTO DIFF WBC: CPT

## 2018-02-22 PROCEDURE — 36415 COLL VENOUS BLD VENIPUNCTURE: CPT

## 2018-02-22 PROCEDURE — G8979 MOBILITY GOAL STATUS: HCPCS | Mod: CI

## 2018-02-22 PROCEDURE — 700102 HCHG RX REV CODE 250 W/ 637 OVERRIDE(OP): Performed by: FAMILY MEDICINE

## 2018-02-22 PROCEDURE — 80053 COMPREHEN METABOLIC PANEL: CPT

## 2018-02-22 PROCEDURE — 700111 HCHG RX REV CODE 636 W/ 250 OVERRIDE (IP): Performed by: ORTHOPAEDIC SURGERY

## 2018-02-22 PROCEDURE — 770006 HCHG ROOM/CARE - MED/SURG/GYN SEMI*

## 2018-02-22 PROCEDURE — 97162 PT EVAL MOD COMPLEX 30 MIN: CPT

## 2018-02-22 PROCEDURE — G8987 SELF CARE CURRENT STATUS: HCPCS | Mod: CJ

## 2018-02-22 PROCEDURE — 51798 US URINE CAPACITY MEASURE: CPT

## 2018-02-22 PROCEDURE — 99233 SBSQ HOSP IP/OBS HIGH 50: CPT | Performed by: INTERNAL MEDICINE

## 2018-02-22 PROCEDURE — A9270 NON-COVERED ITEM OR SERVICE: HCPCS | Performed by: ORTHOPAEDIC SURGERY

## 2018-02-22 PROCEDURE — 700102 HCHG RX REV CODE 250 W/ 637 OVERRIDE(OP): Performed by: ORTHOPAEDIC SURGERY

## 2018-02-22 PROCEDURE — G8978 MOBILITY CURRENT STATUS: HCPCS | Mod: CJ

## 2018-02-22 PROCEDURE — G8988 SELF CARE GOAL STATUS: HCPCS | Mod: CI

## 2018-02-22 RX ADMIN — KETOROLAC TROMETHAMINE 15 MG: 30 INJECTION, SOLUTION INTRAMUSCULAR at 20:13

## 2018-02-22 RX ADMIN — SODIUM CHLORIDE: 9 INJECTION, SOLUTION INTRAVENOUS at 12:51

## 2018-02-22 RX ADMIN — KETOROLAC TROMETHAMINE 15 MG: 30 INJECTION, SOLUTION INTRAMUSCULAR at 01:21

## 2018-02-22 RX ADMIN — ACETAMINOPHEN 650 MG: 325 TABLET, FILM COATED ORAL at 06:24

## 2018-02-22 RX ADMIN — CEFAZOLIN SODIUM 2 G: 2 INJECTION, SOLUTION INTRAVENOUS at 05:23

## 2018-02-22 RX ADMIN — KETOROLAC TROMETHAMINE 15 MG: 30 INJECTION, SOLUTION INTRAMUSCULAR at 06:24

## 2018-02-22 RX ADMIN — ACETAMINOPHEN 650 MG: 325 TABLET, FILM COATED ORAL at 01:20

## 2018-02-22 RX ADMIN — KETOROLAC TROMETHAMINE 15 MG: 30 INJECTION, SOLUTION INTRAMUSCULAR at 12:50

## 2018-02-22 RX ADMIN — ENOXAPARIN SODIUM 40 MG: 100 INJECTION SUBCUTANEOUS at 01:21

## 2018-02-22 RX ADMIN — ACETAMINOPHEN 650 MG: 325 TABLET, FILM COATED ORAL at 12:51

## 2018-02-22 RX ADMIN — ACETAMINOPHEN 650 MG: 325 TABLET, FILM COATED ORAL at 20:13

## 2018-02-22 RX ADMIN — AMLODIPINE BESYLATE 5 MG: 5 TABLET ORAL at 09:08

## 2018-02-22 RX ADMIN — LISINOPRIL 5 MG: 2.5 TABLET ORAL at 09:08

## 2018-02-22 ASSESSMENT — ENCOUNTER SYMPTOMS
WHEEZING: 0
FEVER: 0
BLURRED VISION: 0
NAUSEA: 0
PALPITATIONS: 0
NECK PAIN: 0
WEAKNESS: 0
SEIZURES: 0
SPEECH CHANGE: 0
WEIGHT LOSS: 0
PND: 0
SPUTUM PRODUCTION: 0
BACK PAIN: 0
FALLS: 0
ABDOMINAL PAIN: 0
CHILLS: 0
VOMITING: 0
DIARRHEA: 0
DIZZINESS: 0
HEARTBURN: 0
COUGH: 0
HEADACHES: 0
TREMORS: 0
EYE PAIN: 0
DEPRESSION: 0
SHORTNESS OF BREATH: 0
CONSTIPATION: 0

## 2018-02-22 ASSESSMENT — COGNITIVE AND FUNCTIONAL STATUS - GENERAL
WALKING IN HOSPITAL ROOM: A LITTLE
SUGGESTED CMS G CODE MODIFIER MOBILITY: CK
MOVING TO AND FROM BED TO CHAIR: A LOT
TOILETING: A LITTLE
CLIMB 3 TO 5 STEPS WITH RAILING: A LITTLE
PERSONAL GROOMING: A LITTLE
TURNING FROM BACK TO SIDE WHILE IN FLAT BAD: A LOT
SUGGESTED CMS G CODE MODIFIER DAILY ACTIVITY: CJ
STANDING UP FROM CHAIR USING ARMS: A LITTLE
DAILY ACTIVITIY SCORE: 20
MOVING FROM LYING ON BACK TO SITTING ON SIDE OF FLAT BED: A LITTLE
HELP NEEDED FOR BATHING: A LITTLE
MOBILITY SCORE: 16
DRESSING REGULAR LOWER BODY CLOTHING: A LITTLE

## 2018-02-22 ASSESSMENT — GAIT ASSESSMENTS
DISTANCE (FEET): 50
GAIT LEVEL OF ASSIST: STAND BY ASSIST
ASSISTIVE DEVICE: FRONT WHEEL WALKER
DEVIATION: SHUFFLED GAIT;BRADYKINETIC

## 2018-02-22 ASSESSMENT — PAIN SCALES - GENERAL
PAINLEVEL_OUTOF10: 0
PAINLEVEL_OUTOF10: 1
PAINLEVEL_OUTOF10: 0

## 2018-02-22 ASSESSMENT — ACTIVITIES OF DAILY LIVING (ADL): TOILETING: INDEPENDENT

## 2018-02-22 ASSESSMENT — LIFESTYLE VARIABLES: SUBSTANCE_ABUSE: 0

## 2018-02-22 NOTE — PROGRESS NOTES
"Patient seen and examined    Blood pressure 113/60, pulse 87, temperature 36.8 °C (98.3 °F), resp. rate 16, height 1.473 m (4' 10\"), weight 49.9 kg (110 lb 0.2 oz), SpO2 95 %, not currently breastfeeding.    Recent Labs      02/20/18 2011 02/22/18   0411   WBC  16.5*  16.9*   RBC  4.00*  3.21*   HEMOGLOBIN  11.8*  9.8*   HEMATOCRIT  35.3*  28.9*   MCV  88.3  90.0   MCH  29.5  30.5   MCHC  33.4*  33.9   RDW  40.9  41.5   PLATELETCT  338  240   MPV  10.4  10.6       No acute distress  Dressing clean dry and intact  Neurovascularly intact    POD#1    Plan:  DVT Prophylaxis- TEDS/SCDs, lovenox  Weight Bearing Status-WBAT  PT/OT  Antibiotics: 24 hrs post op  Case Coordination          "

## 2018-02-22 NOTE — CARE PLAN
Problem: Safety  Goal: Will remain free from falls  Outcome: PROGRESSING AS EXPECTED    Intervention: Implement fall precautions  Bed in low position, wheels locked, call light within reach, hourly rounding in place.      Problem: Pain Management  Goal: Pain level will decrease to patient's comfort goal  Outcome: PROGRESSING AS EXPECTED    Intervention: Follow pain managment plan developed in collaboration with patient and Interdisciplinary Team  Patient denies having any pain and declines pain medications.

## 2018-02-22 NOTE — PROGRESS NOTES
[facility] Hospitalist Progress Note    Date of Service: 2018    Chief Complaint  77 y.o. female admitted 2018 with Closed intertrochanteric fracture of right femur    Interval Problem Update   POD #1, tolerated the procedure. Denies fever, chills, nausea vomiting diarrhea. No significant chief complaint. No acute event.Patient otherwise denies fever, chills, nausea, vomiting, adb pain, SOB, CP, headache, constipation, diarrhea, cough, or sputum.      Consultants/Specialty  ortho    Disposition  TBD        Review of Systems   Constitutional: Negative for chills, fever and weight loss.   HENT: Negative for congestion, ear discharge, ear pain, hearing loss and nosebleeds.    Eyes: Negative for blurred vision and pain.   Respiratory: Negative for cough, sputum production, shortness of breath and wheezing.    Cardiovascular: Negative for chest pain, palpitations, leg swelling and PND.   Gastrointestinal: Negative for abdominal pain, constipation, diarrhea, heartburn, nausea and vomiting.   Genitourinary: Negative for dysuria, frequency and hematuria.   Musculoskeletal: Negative for back pain, falls and neck pain.   Skin: Negative for rash.   Neurological: Negative for dizziness, tremors, speech change, seizures, weakness and headaches.   Psychiatric/Behavioral: Negative for depression, substance abuse and suicidal ideas.      Physical Exam  Laboratory/Imaging   Hemodynamics  Temp (24hrs), Av.7 °C (98 °F), Min:36.2 °C (97.2 °F), Max:37.4 °C (99.4 °F)   Temperature: 37.2 °C (99 °F)  Pulse  Av.3  Min: 79  Max: 108 Heart Rate (Monitored): 82  Blood Pressure : 106/73, NIBP: 120/56      Respiratory      Respiration: 15, Pulse Oximetry: 99 %             Fluids    Intake/Output Summary (Last 24 hours) at 18 0849  Last data filed at 18 0500   Gross per 24 hour   Intake              450 ml   Output             1300 ml   Net             -850 ml       Nutrition  Orders Placed This Encounter    Procedures   • DIET ORDER     Standing Status:   Standing     Number of Occurrences:   1     Order Specific Question:   Diet:     Answer:   Regular [1]     Physical Exam   Constitutional: She is oriented to person, place, and time. She appears well-developed and well-nourished.   HENT:   Head: Normocephalic.   Nose: Nose normal.   Mouth/Throat: No oropharyngeal exudate.   Eyes: EOM are normal. Pupils are equal, round, and reactive to light.   Neck: Normal range of motion. Neck supple. No JVD present. No thyromegaly present.   Cardiovascular: Normal rate, regular rhythm and normal heart sounds.  Exam reveals no gallop and no friction rub.    No murmur heard.  Pulmonary/Chest: Effort normal and breath sounds normal. No respiratory distress. She has no wheezes. She has no rales.   Abdominal: Soft. Bowel sounds are normal. She exhibits no distension and no mass. There is no tenderness. There is no rebound and no guarding.   Musculoskeletal: She exhibits no edema or tenderness.   Decreased range of motion due to pain   Lymphadenopathy:     She has no cervical adenopathy.   Neurological: She is alert and oriented to person, place, and time. No cranial nerve deficit.   Skin: Skin is warm. No rash noted.   Psychiatric: Her behavior is normal.       Recent Labs      02/20/18 2011 02/22/18   0411   WBC  16.5*  16.9*   RBC  4.00*  3.21*   HEMOGLOBIN  11.8*  9.8*   HEMATOCRIT  35.3*  28.9*   MCV  88.3  90.0   MCH  29.5  30.5   MCHC  33.4*  33.9   RDW  40.9  41.5   PLATELETCT  338  240   MPV  10.4  10.6     Recent Labs      02/20/18 2011 02/22/18   0412   SODIUM  139  139   POTASSIUM  3.5*  3.8   CHLORIDE  106  106   CO2  23  23   GLUCOSE  124*  126*   BUN  11  10   CREATININE  0.87  0.74   CALCIUM  9.9  8.4*     Recent Labs      02/20/18 2011   APTT  26.5   INR  1.08     Recent Labs      02/20/18 2011   BNPBTYPENAT  44              Assessment/Plan     * Closed intertrochanteric fracture of right femur (CMS-HCC)-  (present on admission)   Assessment & Plan    As seen on x-ray. Status post mechanical fall. Orthopedic surgeon consulted. On full precautions.   POD #1  Tolerated the procedure very well. PTOT  Possibly need SNF        UTI (urinary tract infection)   Assessment & Plan    UA was positive. Urine was sent for culture. IV antibiotics for now.        Hypokalemia- (present on admission)   Assessment & Plan    On replacement.  resolved        Normocytic anemia- (present on admission)   Assessment & Plan    Monitor H&H.  Hgb 11->9  poost op        Leukocytosis- (present on admission)   Assessment & Plan    Stress likely.   Procalcitonin less than 0.05  No symptom of UTI or pneumonia          Quality-Core Measures   Reviewed items::  Labs reviewed, Medications reviewed and Radiology images reviewed  Guzmán catheter::  No Guzmán  DVT prophylaxis pharmacological::  Enoxaparin (Lovenox)  DVT prophylaxis - mechanical:  SCDs  Ulcer Prophylaxis::  Yes  Assessed for rehabilitation services:  Patient was assess for and/or received rehabilitation services during this hospitalization   For complexity-based billing, please refer to the history, exam, and decison making above. In addition, I spent >35 minutes caring for the patient today. More than 50% of the time was spent counseling and coordinating care.    I have discussed with RN and CM and SW and other consultants about patient's plan.

## 2018-02-22 NOTE — PROGRESS NOTES
Patient back from PACU, VSS, resting comfortable. Family at bedside. Dressing CDI and pulse heard by doppler.

## 2018-02-22 NOTE — THERAPY
"Occupational Therapy Evaluation completed.   Functional Status:  Min A supine to sit.  CGA to don underwear seated EOB.  SBA sit to stand and to walk hallway with FWW.  Pt completed toilet transfer and toileting (hygiene and LB clothing management) with CGA.  Pt returned to bed with min A.  Plan of Care: Will benefit from Occupational Therapy 3 times per week  Discharge Recommendations:  Equipment: Front-Wheel Walker and Shower Chair. Post-acute therapy Discharge to home with outpatient or home health for additional skilled therapy services.    See \"Rehab Therapy-Acute\" Patient Summary Report for complete documentation.    "

## 2018-02-22 NOTE — DISCHARGE PLANNING
TCN met with patient at bedside to discuss her transitional care options. Patient lives in [place] with her  and multiple family members near by. Patient has been up with nursing and therapy and does not feel she will need SNF or HH but may nee OP therapy in the future. TCN advised patient to get an OP referral following DC home. Patient verbalized understanding. TCN available if DC plan changes.

## 2018-02-23 ENCOUNTER — PATIENT OUTREACH (OUTPATIENT)
Dept: HEALTH INFORMATION MANAGEMENT | Facility: OTHER | Age: 78
End: 2018-02-23

## 2018-02-23 VITALS
SYSTOLIC BLOOD PRESSURE: 129 MMHG | RESPIRATION RATE: 18 BRPM | OXYGEN SATURATION: 93 % | BODY MASS INDEX: 23.09 KG/M2 | HEART RATE: 85 BPM | DIASTOLIC BLOOD PRESSURE: 69 MMHG | HEIGHT: 58 IN | WEIGHT: 110.01 LBS | TEMPERATURE: 98.6 F

## 2018-02-23 LAB
BACTERIA UR CULT: NORMAL
SIGNIFICANT IND 70042: NORMAL
SITE SITE: NORMAL
SOURCE SOURCE: NORMAL

## 2018-02-23 PROCEDURE — A9270 NON-COVERED ITEM OR SERVICE: HCPCS | Performed by: ORTHOPAEDIC SURGERY

## 2018-02-23 PROCEDURE — 99239 HOSP IP/OBS DSCHRG MGMT >30: CPT | Performed by: INTERNAL MEDICINE

## 2018-02-23 PROCEDURE — 90670 PCV13 VACCINE IM: CPT | Performed by: ORTHOPAEDIC SURGERY

## 2018-02-23 PROCEDURE — G8988 SELF CARE GOAL STATUS: HCPCS | Mod: CI

## 2018-02-23 PROCEDURE — 97530 THERAPEUTIC ACTIVITIES: CPT

## 2018-02-23 PROCEDURE — 700102 HCHG RX REV CODE 250 W/ 637 OVERRIDE(OP): Performed by: ORTHOPAEDIC SURGERY

## 2018-02-23 PROCEDURE — G8987 SELF CARE CURRENT STATUS: HCPCS | Mod: CI

## 2018-02-23 PROCEDURE — 97116 GAIT TRAINING THERAPY: CPT

## 2018-02-23 PROCEDURE — G8989 SELF CARE D/C STATUS: HCPCS | Mod: CI

## 2018-02-23 PROCEDURE — 90471 IMMUNIZATION ADMIN: CPT

## 2018-02-23 PROCEDURE — 700105 HCHG RX REV CODE 258: Performed by: INTERNAL MEDICINE

## 2018-02-23 PROCEDURE — A9270 NON-COVERED ITEM OR SERVICE: HCPCS | Performed by: FAMILY MEDICINE

## 2018-02-23 PROCEDURE — 97535 SELF CARE MNGMENT TRAINING: CPT

## 2018-02-23 PROCEDURE — 3E0234Z INTRODUCTION OF SERUM, TOXOID AND VACCINE INTO MUSCLE, PERCUTANEOUS APPROACH: ICD-10-PCS | Performed by: ORTHOPAEDIC SURGERY

## 2018-02-23 PROCEDURE — 700111 HCHG RX REV CODE 636 W/ 250 OVERRIDE (IP): Performed by: ORTHOPAEDIC SURGERY

## 2018-02-23 PROCEDURE — 700102 HCHG RX REV CODE 250 W/ 637 OVERRIDE(OP): Performed by: FAMILY MEDICINE

## 2018-02-23 RX ADMIN — ENOXAPARIN SODIUM 40 MG: 100 INJECTION SUBCUTANEOUS at 00:04

## 2018-02-23 RX ADMIN — KETOROLAC TROMETHAMINE 15 MG: 30 INJECTION, SOLUTION INTRAMUSCULAR at 00:03

## 2018-02-23 RX ADMIN — PNEUMOCOCCAL 13-VALENT CONJUGATE VACCINE 0.5 ML: 2.2; 2.2; 2.2; 2.2; 2.2; 4.4; 2.2; 2.2; 2.2; 2.2; 2.2; 2.2; 2.2 INJECTION, SUSPENSION INTRAMUSCULAR at 13:38

## 2018-02-23 RX ADMIN — ACETAMINOPHEN 650 MG: 325 TABLET, FILM COATED ORAL at 08:15

## 2018-02-23 RX ADMIN — AMLODIPINE BESYLATE 5 MG: 5 TABLET ORAL at 08:15

## 2018-02-23 RX ADMIN — SODIUM CHLORIDE: 9 INJECTION, SOLUTION INTRAVENOUS at 00:14

## 2018-02-23 RX ADMIN — KETOROLAC TROMETHAMINE 15 MG: 30 INJECTION, SOLUTION INTRAMUSCULAR at 08:16

## 2018-02-23 RX ADMIN — SODIUM CHLORIDE: 9 INJECTION, SOLUTION INTRAVENOUS at 11:00

## 2018-02-23 RX ADMIN — LISINOPRIL 5 MG: 2.5 TABLET ORAL at 08:14

## 2018-02-23 RX ADMIN — ACETAMINOPHEN 650 MG: 325 TABLET, FILM COATED ORAL at 00:03

## 2018-02-23 RX ADMIN — KETOROLAC TROMETHAMINE 15 MG: 30 INJECTION, SOLUTION INTRAMUSCULAR at 13:23

## 2018-02-23 RX ADMIN — ACETAMINOPHEN 650 MG: 325 TABLET, FILM COATED ORAL at 13:23

## 2018-02-23 ASSESSMENT — COGNITIVE AND FUNCTIONAL STATUS - GENERAL
SUGGESTED CMS G CODE MODIFIER MOBILITY: CI
CLIMB 3 TO 5 STEPS WITH RAILING: A LITTLE
DRESSING REGULAR LOWER BODY CLOTHING: A LITTLE
DAILY ACTIVITIY SCORE: 23
SUGGESTED CMS G CODE MODIFIER DAILY ACTIVITY: CI
MOBILITY SCORE: 23

## 2018-02-23 ASSESSMENT — PAIN SCALES - GENERAL: PAINLEVEL_OUTOF10: 0

## 2018-02-23 ASSESSMENT — GAIT ASSESSMENTS
GAIT LEVEL OF ASSIST: MODIFIED INDEPENDENT
DISTANCE (FEET): 500
ASSISTIVE DEVICE: FRONT WHEEL WALKER
DEVIATION: BRADYKINETIC

## 2018-02-23 NOTE — FACE TO FACE
Face to Face Note  -  Durable Medical Equipment    Laura Chi M.D. - NPI: [id number]  I certify that this patient is under my care and that they had a durable medical equipment(DME)face to face encounter by myself that meets the physician DME face-to-face encounter requirements with this patient on:    Date of encounter:   Patient:                    MRN:                       YOB: 2020  Rukhsana Solis  [id number]  1940     The encounter with the patient was in whole, or in part, for the following medical condition, which is the primary reason for durable medical equipment:  Post-Op Surgery    I certify that, based on my findings, the following durable medical equipment is medically necessary:  Walkers and Other DME Equipment - shower chair.    HOME O2 Saturation Measurements:(Values must be present for Home Oxygen orders)         ,     ,         My Clinical findings support the need for the above equipment due to:  Abnormal Gait    Supporting Symptoms:

## 2018-02-23 NOTE — PROGRESS NOTES
"   Orthopaedic Progress Note    Interval changes:  Patient doing very well   Cleared for DC home tomorrow by ortho pending medicine and therapy clearance    ROS - Patient denies any new issues.  Pain well controlled.    Blood pressure 120/66, pulse (!) 103, temperature 37.6 °C (99.6 °F), resp. rate 14, height 1.473 m (4' 10\"), weight 49.9 kg (110 lb 0.2 oz), SpO2 92 %, not currently breastfeeding.      Patient seen and examined  No acute distress  Breathing non labored  RRR  RLE Surgical dressing is clean, dry, and intact. Patient clearly fires tibialis anterior, EHL, and gastrocnemius/soleus. Sensation is intact to light touch throughout superficial peroneal, deep peroneal, tibial, saphenous, and sural nerve distributions. Strong and palpable 2+ dorsalis pedis and posterior tibial pulses with capillary refill less than 2 seconds. No lower leg tenderness or discomfort.       Recent Labs      02/20/18 2011 02/22/18   0411   WBC  16.5*  16.9*   RBC  4.00*  3.21*   HEMOGLOBIN  11.8*  9.8*   HEMATOCRIT  35.3*  28.9*   MCV  88.3  90.0   MCH  29.5  30.5   MCHC  33.4*  33.9   RDW  40.9  41.5   PLATELETCT  338  240   MPV  10.4  10.6       Active Hospital Problems    Diagnosis   • Closed intertrochanteric fracture of right femur (CMS-[facility]) [S72.141A]     Priority: High   • Leukocytosis [D72.829]   • Normocytic anemia [D64.9]   • Hypokalemia [E87.6]       Assessment/Plan:  Patient cleared for DC home tomorrow by ortho pending medicine and therapy clearance  POD#1 S/P Intramedullary nail, right hip  Wt bearing status - WBAT  Wound care/Drains - dressing change tomorrow  Future Procedures - none planned  Lovenox: Start 2/22, Duration-until ambulatory > 150'  Sutures/Staples out- 10-14 days post operatively  PT/OT-initiated  Antibiotics: completed  DVT Prophylaxis- TEDS/SCDs/Foot pumps  Guzmán-none  Case Coordination for Discharge Planning - Disposition home    "

## 2018-02-23 NOTE — THERAPY
"Occupational Therapy Treatment completed with focus on ADLs, ADL transfers.  Functional Status:  Pt was seen for tx and agreeable. Pt is Mod I bed mobility, using rails to get out of bed. Pt reports she will sleep in an Easy Chair for the first few days when she gets home d/t height of bed. Pt was Mod I for transfer to FWW level, ambulated at spv to toilet and Mod I toilet transfer. Pt needs slight assist with donning R sock, but able to dress Independently otherwise. Pt does not require further OT services as all goals are met and is safe for d/c when medically cleared.   Plan of Care: Patient with no further skilled OT needs in the acute care setting at this time  Discharge Recommendations:  Equipment No Equipment Needed. Post-acute therapy Discharge to home with outpatient or home health for additional skilled therapy services. and Currently anticipate no further skilled therapy needs once patient is discharged from the inpatient setting.    See \"Rehab Therapy-Acute\" Patient Summary Report for complete documentation.   "

## 2018-02-23 NOTE — PROGRESS NOTES
Pt discharged home with family, escorted out by staff. All personal belongings sent with the patient upon discharge. Pneumonia vaccine given prior to discharge. Discharge instructions and prescriptions explained with verbal understanding provided by the patient. All needs met and all questions answered. PIV removed and documented.

## 2018-02-23 NOTE — CARE PLAN
Problem: Safety  Goal: Will remain free from injury  Outcome: PROGRESSING AS EXPECTED  Patient remained free from injury.    Problem: Infection  Goal: Will remain free from infection  Outcome: PROGRESSING AS EXPECTED  Patient shows no signs/symptoms of infection.

## 2018-02-23 NOTE — PROGRESS NOTES
Assumed care of patient following shift report. Patient is A&Ox4. She is sitting up in bed watching TV. She reports mild pain and scheduled tylenol and toradol were administered. Right hip dressings gauze and tegaderm are CDI. Patient has been performing ROM exercises as tolerated and ambulating with assistance as tolerated. Plan of care reviewed. Safety precautions reviewed. Call device in reach. Will continue to monitor.

## 2018-02-23 NOTE — DISCHARGE PLANNING
[facility] and [facility] has accepted the patient.

## 2018-02-23 NOTE — DISCHARGE INSTRUCTIONS
Follow up with doctor for staple removal in 10-14 days.  Dr. Adamson [phone] at the [facility]   Weight bearing as tolerated.   Perform activity with walker at all times.  Discharge Instructions    Discharged to home by car with relative. Discharged via wheelchair, hospital escort: Yes.  Special equipment needed: Walker    Be sure to schedule a follow-up appointment with your primary care doctor or any specialists as instructed.     Discharge Plan:   Diet Plan: Discussed  Activity Level: Discussed  Confirmed Follow up Appointment: Patient to Call and Schedule Appointment  Confirmed Symptoms Management: Discussed  Medication Reconciliation Updated: Yes  Influenza Vaccine Indication: Patient Refuses    I understand that a diet low in cholesterol, fat, and sodium is recommended for good health. Unless I have been given specific instructions below for another diet, I accept this instruction as my diet prescription.   Other diet: Regular     Special Instructions: Discharge instructions for the Orthopedic Patient    Follow up with Primary Care Physician within 2 weeks of discharge to home, regarding:  Review of medications and diagnostic testing.  Surveillance for medical complications.  Workup and treatment of osteoporosis, if appropriate.     -Is this a Joint Replacement patient? No    -Is this patient being discharged with medication to prevent blood clots?  No    · Is patient discharged on Warfarin / Coumadin?   No     Depression / Suicide Risk    As you are discharged from this [facility] facility, it is important to learn how to keep safe from harming yourself.    Recognize the warning signs:  · Abrupt changes in personality, positive or negative- including increase in energy   · Giving away possessions  · Change in eating patterns- significant weight changes-  positive or negative  · Change in sleeping patterns- unable to sleep or sleeping all the time   · Unwillingness or inability to  communicate  · Depression  · Unusual sadness, discouragement and loneliness  · Talk of wanting to die  · Neglect of personal appearance   · Rebelliousness- reckless behavior  · Withdrawal from people/activities they love  · Confusion- inability to concentrate     If you or a loved one observes any of these behaviors or has concerns about self-harm, here's what you can do:  · Talk about it- your feelings and reasons for harming yourself  · Remove any means that you might use to hurt yourself (examples: pills, rope, extension cords, firearm)  · Get professional help from the community (Mental Health, Substance Abuse, psychological counseling)  · Do not be alone:Call your Safe Contact- someone whom you trust who will be there for you.  · Call your local CRISIS HOTLINE 235-7579 or 810-720-6170  · Call your local Children's Mobile Crisis Response Team [place] (499) 935-8523 or www.[employer]  · Call the toll free National Suicide Prevention Hotlines   · National Suicide Prevention Lifeline 620-674-SBQM (6306)  · National Hope Line Network 800-SUICIDE (196-6821)       Incision Care  An incision is when a surgeon cuts into your body. After surgery, the incision needs to be cared for properly to prevent infection.   HOW TO CARE FOR YOUR INCISION  Take medicines only as directed by your health care provider.  There are many different ways to close and cover an incision, including stitches, skin glue, and adhesive strips. Follow your health care provider's instructions on:  Incision care.  Bandage (dressing) changes and removal.  Incision closure removal.  Do not take baths, swim, or use a hot tub until your health care provider approves. You may shower as directed by your health care provider.  Resume your normal diet and activities as directed.  Use anti-itch medicine (such as an antihistamine) as directed by your health care provider. The incision may itch while it is healing. Do not pick or scratch at the  incision.  Drink enough fluid to keep your urine clear or pale yellow.  SEEK MEDICAL CARE IF:   You have drainage, redness, swelling, or pain at your incision site.  You have muscle aches, chills, or a general ill feeling.  You notice a bad smell coming from the incision or dressing.  Your incision edges separate after the sutures, staples, or skin adhesive strips have been removed.  You have persistent nausea or vomiting.  You have a fever.  You are dizzy.  SEEK IMMEDIATE MEDICAL CARE IF:   You have a rash.  You faint.  You have difficulty breathing.  MAKE SURE YOU:   Understand these instructions.  Will watch your condition.  Will get help right away if you are not doing well or get worse.     This information is not intended to replace advice given to you by your health care provider. Make sure you discuss any questions you have with your health care provider.     Document Released: 07/07/2006 Document Revised: 01/08/2016 Document Reviewed: 02/11/2015  [employer] Interactive Patient Education ©2016 [employer]    Hip Fracture  A hip fracture is a fracture of the upper part of your thigh bone (femur).   CAUSES  A hip fracture is caused by a direct blow to the side of your hip. This is usually the result of a fall but can occur in other circumstances, such as an automobile accident.  RISK FACTORS  There is an increased risk of hip fractures in people with:  An unsteady walking pattern (gait) and those with conditions that contribute to poor balance, such as Parkinson's disease or dementia.  Osteopenia and osteoporosis.  Cancer that spreads to the leg bones.  Certain metabolic diseases.  SYMPTOMS   Symptoms of hip fracture include:  Pain over the injured hip.  Inability to put weight on the leg in which the fracture occurred (although, some patients are able to walk after a hip fracture).  Toes and foot of the affected leg point outward when you lie down.  DIAGNOSIS  A physical exam can determine if a hip fracture  is likely to have occurred. X-ray exams are needed to confirm the fracture and to look for other injuries. The X-ray exam can help to determine the type of hip fracture. Rarely, the fracture is not visible on an X-ray image and a CT scan or MRI will have to be done.  TREATMENT   The treatment for a fracture is usually surgery. This means using a screw, nail, or mabel to hold the bones in place.   HOME CARE INSTRUCTIONS  Take all medicines as directed by your health care provider.  SEEK MEDICAL CARE IF:  Pain continues, even after taking pain medicine.  MAKE SURE YOU:  Understand these instructions.    Will watch your condition.  Will get help right away if you are not doing well or get worse.     This information is not intended to replace advice given to you by your health care provider. Make sure you discuss any questions you have with your health care provider.     Document Released: 12/18/2006 Document Revised: 12/23/2014 Document Reviewed: 07/30/2014  [employer] Interactive Patient Education ©2016 [employer]

## 2018-02-23 NOTE — DISCHARGE SUMMARY
Hospital Medicine Discharge Note     Admit Date:  2/20/2018       Discharge Date:   2/23/2018    Attending Physician:  Laura Chi     Diagnoses (includes active and resolved):       Closed intertrochanteric fracture of right femur (CMS-HCC) POA: Yes    Leukocytosis POA: Yes    Normocytic anemia POA: Yes    Hypokalemia POA: Yes      Chief Complaint   Patient presents with   • Hip Pain     R hip/leg pain status post GLF at home today aroud 1400.    • Leg Pain       Hosiery of Present Illness  Patient is a 77-year-old female admitted for mechanical fall and noticed to have right intratrochanteric femoral fracture.   Detailed info pls see H&P.    Hospital Summary (Brief Narrative):       77 y.o. female admitted 2/20/2018 with Closed intertrochanteric fracture of right femur and patient was treated by orthopedics with Intramedullary nail, right hip. Patient tolerated procedure very well. Patient received postop recovery support during the hospital stay. Physical therapy and occupational therapy recommended patient can be discharged home with outpatient PT and front wheel walker. Patient currently have good family support and wants to go home. Patient really required only minimal amount of Tylenol for pain control. Patient current is stable and will be discharged home with from a walker and family support.     Consultants:      ortho    Procedures:        Intramedullary nail, right hip.    Discharge Medications:        (x)  Medication Reconciliation Completed       Medication List      CONTINUE taking these medications      Instructions   amLODIPine 5 MG Tabs  Commonly known as:  NORVASC   Take 5 mg by mouth every day.  Dose:  5 mg     lisinopril 5 MG Tabs  Commonly known as:  PRINIVIL   Take 5 mg by mouth every day.  Dose:  5 mg            Disposition:   Discharge home    Diet:   Regular    Activity:   As tolerated    Code status:   Full code    Primary Care Provider:    FEMI Hernández    Follow up appointment  details :      PCP in 2 weeks  [facility]  [address]  [place] [phone]  In 1 week          Pending Studies:        xNone    Time spent on discharge day patient visit: >35 minutes    #################################################    Interval history/exam for day of discharge:    Vitals:    02/22/18 1855 02/23/18 0400 02/23/18 0812 02/23/18 1217   BP: 109/61 120/68 118/60 129/69   Pulse: 100 83 86 85   Resp: 18 16 12 18   Temp:  37.2 °C (98.9 °F) 36.8 °C (98.2 °F) 37 °C (98.6 °F)   SpO2: 93% 91% 94% 93%   Weight:       Height:         Weight/BMI: Body mass index is 22.99 kg/m².  Pulse Oximetry: 93 %, O2 (LPM): 0, O2 Delivery: None (Room Air)    Gen: AAOx3, NAD  Eyes: PELLA  Neck: no JVD, no lymphadenopathy  Cardia: RRR, no mrg  Lungs: CTAB, no rales, rhonci or wheezing  Abd: NABS, soft, non extended, no mass  EXT: No C/C/E, peripheral pulse 2+ b/l  Neuro: CN II-XII intact, non focal, reflex 2+ symmetrical  Skin: Intact, no lesion, warm  Psych: Appropriate.    Most Recent Labs:    Lab Results   Component Value Date/Time    WBC 16.9 (H) 02/22/2018 04:11 AM    RBC 3.21 (L) 02/22/2018 04:11 AM    HEMOGLOBIN 9.8 (L) 02/22/2018 04:11 AM    HEMATOCRIT 28.9 (L) 02/22/2018 04:11 AM    MCV 90.0 02/22/2018 04:11 AM    MCH 30.5 02/22/2018 04:11 AM    MCHC 33.9 02/22/2018 04:11 AM    MPV 10.6 02/22/2018 04:11 AM    NEUTSPOLYS 87.30 (H) 02/22/2018 04:11 AM    LYMPHOCYTES 7.60 (L) 02/22/2018 04:11 AM    MONOCYTES 4.40 02/22/2018 04:11 AM    EOSINOPHILS 0.00 02/22/2018 04:11 AM    BASOPHILS 0.10 02/22/2018 04:11 AM      Lab Results   Component Value Date/Time    SODIUM 139 02/22/2018 04:12 AM    POTASSIUM 3.8 02/22/2018 04:12 AM    CHLORIDE 106 02/22/2018 04:12 AM    CO2 23 02/22/2018 04:12 AM    GLUCOSE 126 (H) 02/22/2018 04:12 AM    BUN 10 02/22/2018 04:12 AM    CREATININE 0.74 02/22/2018 04:12 AM      Lab Results   Component Value Date/Time    ALTSGPT 10 02/22/2018 04:12 AM    ASTSGOT  16 02/22/2018 04:12 AM    ALKPHOSPHAT 35 02/22/2018 04:12 AM    TBILIRUBIN 0.2 02/22/2018 04:12 AM    ALBUMIN 3.6 02/22/2018 04:12 AM    GLOBULIN 2.4 02/22/2018 04:12 AM    INR 1.08 02/20/2018 08:11 PM     Lab Results   Component Value Date/Time    PROTHROMBTM 13.7 02/20/2018 08:11 PM    INR 1.08 02/20/2018 08:11 PM        Imaging/ Testing:      DX-PORTABLE FLUOROSCOPY < 1 HOUR   Final Result         Portable fluoroscopy utilized for 12 seconds.      DX-HIP-UNILATERAL-W/O PELVIS-2/3 VIEWS RIGHT   Final Result      Limited intraoperative images showing open reduction and internal fixation of RIGHT proximal femur fracture.      DX-FEMUR-2+ RIGHT   Final Result         Acute displaced intertrochanteric fracture of the right femur.      DX-PELVIS-1 OR 2 VIEWS   Final Result         Acute displaced intertrochanteric fracture of the right femur.      DX-CHEST-LIMITED (1 VIEW)   Final Result         No acute cardiopulmonary abnormalities are identified.      OUTSIDE IMAGES-DX PELVIS   Final Result          Instructions:      The patient was instructed to return to the ER in the event of worsening symptoms. I have counseled the patient on the importance of compliance and the patient has agreed to proceed with all medical recommendations and follow up plan indicated above.   The patient understands that all medications come with benefits and risks. Risks may include permanent injury or death and these risks can be minimized with close reassessment and monitoring.        This dictation was created using voice recognition software. The accuracy of the dictation is limited to the abilities of the software. Although every efforts have been used to decrease the error, I expect there may be some errors of grammar and possibly content.

## 2018-02-23 NOTE — PROGRESS NOTES
"Patient seen and examined    Blood pressure 129/69, pulse 85, temperature 37 °C (98.6 °F), resp. rate 18, height 1.473 m (4' 10\"), weight 49.9 kg (110 lb 0.2 oz), SpO2 93 %, not currently breastfeeding.    Recent Labs      02/20/18 2011 02/22/18   0411   WBC  16.5*  16.9*   RBC  4.00*  3.21*   HEMOGLOBIN  11.8*  9.8*   HEMATOCRIT  35.3*  28.9*   MCV  88.3  90.0   MCH  29.5  30.5   MCHC  33.4*  33.9   RDW  40.9  41.5   PLATELETCT  338  240   MPV  10.4  10.6       No acute distress  Dressing clean dry and intact  Neurovascularly intact    POD#2    Plan:  DVT Prophylaxis- TEDS/SCDs  Weight Bearing Status-wbat  PT/OT  Antibiotics: None  Home today          "

## 2018-02-23 NOTE — THERAPY
"Physical Therapy Treatment completed.   Bed Mobility:  Supine to Sit: Modified Independent  Transfers: Sit to Stand: Modified Independent  Gait: Level Of Assist: Modified Independent with Front-Wheel Walker       Plan of Care: Will benefit from Physical Therapy 5 times per week  Discharge Recommendations: Equipment: Front-Wheel Walker. Peds size FWW.    See \"Rehab Therapy-Acute\" Patient Summary Report for complete documentation.     Pt presenting w/ improved functional mobility. Pt is able to perform most mobility at a Parish-SPV level. Pt w/ very minimal c/o pain and states \"I have no pain so I can start to do things for myself.\" Pt very motivated to participate and continue rehab progress. Discussed post acute options w/ pt agreeable to outpatient physical therapy.  "

## 2018-02-23 NOTE — CARE PLAN
Problem: Urinary Elimination:  Goal: Ability to reestablish a normal urinary elimination pattern will improve  Outcome: PROGRESSING AS EXPECTED  Patient has voided s/p allan removal. Toileting assistance offered during rounds.    Problem: Mobility  Goal: Risk for activity intolerance will decrease  Outcome: PROGRESSING AS EXPECTED  Patient ambulating and performing ROM exercises as tolerated. RLE is WBAT per order.

## 2018-03-18 LAB — EKG IMPRESSION: NORMAL

## 2018-04-05 NOTE — ADDENDUM NOTE
Encounter addended by: Sharad Mejia M.D. on: 4/4/2018 10:18 PM<BR>    Actions taken: Sign clinical note

## 2018-07-26 ENCOUNTER — APPOINTMENT (RX ONLY)
Dept: URBAN - METROPOLITAN AREA CLINIC 4 | Facility: CLINIC | Age: 78
Setting detail: DERMATOLOGY
End: 2018-07-26

## 2018-07-26 DIAGNOSIS — L81.4 OTHER MELANIN HYPERPIGMENTATION: ICD-10-CM

## 2018-07-26 DIAGNOSIS — L72.0 EPIDERMAL CYST: ICD-10-CM

## 2018-07-26 PROBLEM — I10 ESSENTIAL (PRIMARY) HYPERTENSION: Status: ACTIVE | Noted: 2018-07-26

## 2018-07-26 PROCEDURE — ? BENIGN DESTRUCTION COSMETIC

## 2018-07-26 PROCEDURE — ? COUNSELING

## 2018-07-26 PROCEDURE — 99213 OFFICE O/P EST LOW 20 MIN: CPT

## 2018-07-26 ASSESSMENT — LOCATION SIMPLE DESCRIPTION DERM
LOCATION SIMPLE: NECK
LOCATION SIMPLE: INFERIOR FOREHEAD
LOCATION SIMPLE: SCALP
LOCATION SIMPLE: LEFT LIP

## 2018-07-26 ASSESSMENT — LOCATION ZONE DERM
LOCATION ZONE: NECK
LOCATION ZONE: SCALP
LOCATION ZONE: LIP
LOCATION ZONE: FACE

## 2018-07-26 ASSESSMENT — LOCATION DETAILED DESCRIPTION DERM
LOCATION DETAILED: LEFT SUPERIOR LATERAL NECK
LOCATION DETAILED: LEFT INFERIOR POSTAURICULAR SKIN
LOCATION DETAILED: LEFT LOWER CUTANEOUS LIP
LOCATION DETAILED: INFERIOR MID FOREHEAD

## 2021-03-12 NOTE — PROGRESS NOTES
Report from PACU at 1307 from Cristin.   pt reports "I cant live with my roommate anymore. I need help with housing."

## (undated) DEVICE — SUCTION INSTRUMENT YANKAUER BULBOUS TIP W/O VENT (50EA/CA)

## (undated) DEVICE — PACK MAJOR ORTHO - (2EA/CA)

## (undated) DEVICE — CHLORAPREP 26 ML APPLICATOR - ORANGE TINT(25/CA)

## (undated) DEVICE — KIT ANESTHESIA W/CIRCUIT & 3/LT BAG W/FILTER (20EA/CA)

## (undated) DEVICE — GLOVE BIOGEL INDICATOR SZ 8 SURGICAL PF LTX - (50/BX 4BX/CA)

## (undated) DEVICE — GOWN WARMING STANDARD FLEX - (30/CA)

## (undated) DEVICE — GLOVE BIOGEL PI INDICATOR SZ 7.0 SURGICAL PF LF - (50/BX 4BX/CA)

## (undated) DEVICE — PROTECTOR ULNA NERVE - (36PR/CA)

## (undated) DEVICE — DRAPE LARGE 3 QUARTER - (20/CA)

## (undated) DEVICE — SET EXTENSION WITH 2 PORTS (48EA/CA) ***PART #2C8610 IS A SUBSTITUTE*****

## (undated) DEVICE — MASK AIRWAY SIZE 3 UNIQUE SILICON (10/BX)

## (undated) DEVICE — ELECTRODE 850 FOAM ADHESIVE - HYDROGEL RADIOTRNSPRNT (50/PK)

## (undated) DEVICE — DRESSING TRANSPARENT FILM TEGADERM 4 X 4.75" (50EA/BX)"

## (undated) DEVICE — DRAPE C-ARM LARGE 41IN X 74 IN - (10/BX 2BX/CA)

## (undated) DEVICE — SET LEADWIRE 5 LEAD BEDSIDE DISPOSABLE ECG (1SET OF 5/EA)

## (undated) DEVICE — GLOVE BIOGEL SZ 6.5 SURGICAL PF LTX (50PR/BX 4BX/CA)

## (undated) DEVICE — DRAPE U ORTHOPEDIC - (10/BX)

## (undated) DEVICE — GUIDE PIN CALIBRATED (5EA/PK) (4TX6=24)

## (undated) DEVICE — SLEEVE, VASO, THIGH, MED

## (undated) DEVICE — SENSOR SPO2 NEO LNCS ADHESIVE (20/BX) SEE USER NOTES

## (undated) DEVICE — TOWELS CLOTH SURGICAL - (4/PK 20PK/CA)

## (undated) DEVICE — TUBING CLEARLINK DUO-VENT - C-FLO (48EA/CA)

## (undated) DEVICE — CANISTER SUCTION 3000ML MECHANICAL FILTER AUTO SHUTOFF MEDI-VAC NONSTERILE LF DISP  (40EA/CA)

## (undated) DEVICE — MASK ANESTHESIA ADULT  - (100/CA)

## (undated) DEVICE — BIT DRILL LONG CALIBRATED 4.2MM X 330MM (4TX2=8)

## (undated) DEVICE — DRAPE SURGICAL U 77X120 - (10/CA)

## (undated) DEVICE — HEAD HOLDER JUNIOR/ADULT

## (undated) DEVICE — GLOVE BIOGEL SZ 7.5 SURGICAL PF LTX - (50PR/BX 4BX/CA)

## (undated) DEVICE — LACTATED RINGERS INJ 1000 ML - (14EA/CA 60CA/PF)

## (undated) DEVICE — KIT ROOM DECONTAMINATION

## (undated) DEVICE — SUTURE 2-0 VICRYL PLUS CT-1 - 8 X 18 INCH(12/BX)

## (undated) DEVICE — NEPTUNE 4 PORT MANIFOLD - (20/PK)

## (undated) DEVICE — SUTURE GENERAL